# Patient Record
Sex: MALE | Race: WHITE | NOT HISPANIC OR LATINO | Employment: FULL TIME | URBAN - METROPOLITAN AREA
[De-identification: names, ages, dates, MRNs, and addresses within clinical notes are randomized per-mention and may not be internally consistent; named-entity substitution may affect disease eponyms.]

---

## 2022-08-02 ENCOUNTER — OFFICE VISIT (OUTPATIENT)
Dept: URGENT CARE | Facility: PHYSICIAN GROUP | Age: 57
End: 2022-08-02
Payer: COMMERCIAL

## 2022-08-02 ENCOUNTER — TELEPHONE (OUTPATIENT)
Dept: SCHEDULING | Facility: IMAGING CENTER | Age: 57
End: 2022-08-02

## 2022-08-02 VITALS
DIASTOLIC BLOOD PRESSURE: 88 MMHG | TEMPERATURE: 97.1 F | HEART RATE: 98 BPM | SYSTOLIC BLOOD PRESSURE: 140 MMHG | OXYGEN SATURATION: 98 % | HEIGHT: 68 IN | BODY MASS INDEX: 28.19 KG/M2 | WEIGHT: 186 LBS | RESPIRATION RATE: 16 BRPM

## 2022-08-02 DIAGNOSIS — Z86.79 HISTORY OF HYPERTENSION: ICD-10-CM

## 2022-08-02 DIAGNOSIS — Z76.0 MEDICATION REFILL: ICD-10-CM

## 2022-08-02 PROCEDURE — 99203 OFFICE O/P NEW LOW 30 MIN: CPT | Performed by: NURSE PRACTITIONER

## 2022-08-02 RX ORDER — LISINOPRIL 20 MG/1
20 TABLET ORAL DAILY
COMMUNITY
End: 2022-08-02 | Stop reason: SDUPTHER

## 2022-08-02 RX ORDER — METOPROLOL SUCCINATE 100 MG/1
100 TABLET, EXTENDED RELEASE ORAL DAILY
COMMUNITY
End: 2022-08-25

## 2022-08-02 RX ORDER — LISINOPRIL 20 MG/1
20 TABLET ORAL DAILY
Qty: 30 TABLET | Refills: 0 | Status: SHIPPED | OUTPATIENT
Start: 2022-08-02 | End: 2022-08-25 | Stop reason: SDUPTHER

## 2022-08-02 RX ORDER — METOPROLOL SUCCINATE 100 MG/1
100 TABLET, EXTENDED RELEASE ORAL DAILY
Qty: 30 TABLET | Refills: 0 | Status: SHIPPED | OUTPATIENT
Start: 2022-08-02 | End: 2022-08-25

## 2022-08-02 ASSESSMENT — ENCOUNTER SYMPTOMS
SENSORY CHANGE: 0
NEUROLOGICAL NEGATIVE: 1
VOMITING: 0
BLURRED VISION: 0
CONSTITUTIONAL NEGATIVE: 1
DIZZINESS: 0
WEAKNESS: 0
NAUSEA: 0
HEADACHES: 0

## 2022-08-02 ASSESSMENT — VISUAL ACUITY: OU: 1

## 2022-08-02 NOTE — PROGRESS NOTES
Subjective:     Stanley Jacinto is a 57 y.o. male who presents for Other (BP prescription refill. PCP not able to see him until end of month. Been off of them for 3 weeks.)       Other  This is a new problem. Pertinent negatives include no headaches, nausea, vomiting or weakness.     Patient presents for medication refills.  History of hypertension.  Takes lisinopril and metoprolol ER.  Has been out of medication for the past 3 weeks.  Denies symptoms at this time.    Has been seeing PCP.  However, was told he had an unpaid co-pay.  He eventually paid it but reports he was unable to get refills unless he is seen again and the next available appointment is not until September.  Currently switching PCPs and has an appointment later this month on the 25th.    Review of Systems   Constitutional: Negative.  Negative for malaise/fatigue.   Eyes: Negative for blurred vision.   Gastrointestinal: Negative for nausea and vomiting.   Neurological: Negative.  Negative for dizziness, sensory change, weakness and headaches.   All other systems reviewed and are negative.    Refer to HPI for additional details.    During this visit, appropriate PPE was worn, hand hygiene was performed, and the patient and any visitors were masked.    PMH:  has no past medical history on file.    MEDS:   Current Outpatient Medications:   •  metoprolol SR (TOPROL XL) 100 MG TABLET SR 24 HR, Take 100 mg by mouth every day., Disp: , Rfl:   •  lisinopril (PRINIVIL) 20 MG Tab, Take 1 Tablet by mouth every day., Disp: 30 Tablet, Rfl: 0  •  metoprolol SR (TOPROL XL) 100 MG TABLET SR 24 HR, Take 1 Tablet by mouth every day., Disp: 30 Tablet, Rfl: 0    ALLERGIES: Not on File  SURGHX: History reviewed. No pertinent surgical history.  SOCHX:  reports that he has never smoked. His smokeless tobacco use includes chew. He reports current alcohol use of about 3.0 oz of alcohol per week. He reports previous drug use.    FH: Per HPI as  "applicable/pertinent.      Objective:     BP (!) 140/88 (BP Location: Right arm, Patient Position: Sitting, BP Cuff Size: Adult)   Pulse 98   Temp 36.2 °C (97.1 °F) (Temporal)   Resp 16   Ht 1.727 m (5' 8\")   Wt 84.4 kg (186 lb)   SpO2 98%   BMI 28.28 kg/m²     Physical Exam  Nursing note reviewed.   Constitutional:       General: He is not in acute distress.     Appearance: He is well-developed. He is not ill-appearing or toxic-appearing.   Eyes:      General: Vision grossly intact.   Cardiovascular:      Rate and Rhythm: Normal rate.   Pulmonary:      Effort: Pulmonary effort is normal. No respiratory distress.   Musculoskeletal:         General: No deformity. Normal range of motion.   Skin:     Coloration: Skin is not pale.   Neurological:      Mental Status: He is alert and oriented to person, place, and time.      Motor: No weakness.   Psychiatric:         Behavior: Behavior normal. Behavior is cooperative.       Assessment/Plan:     1. History of hypertension  - lisinopril (PRINIVIL) 20 MG Tab; Take 1 Tablet by mouth every day.  Dispense: 30 Tablet; Refill: 0  - metoprolol SR (TOPROL XL) 100 MG TABLET SR 24 HR; Take 1 Tablet by mouth every day.  Dispense: 30 Tablet; Refill: 0    2. Medication refill  - lisinopril (PRINIVIL) 20 MG Tab; Take 1 Tablet by mouth every day.  Dispense: 30 Tablet; Refill: 0  - metoprolol SR (TOPROL XL) 100 MG TABLET SR 24 HR; Take 1 Tablet by mouth every day.  Dispense: 30 Tablet; Refill: 0    Bridge prescription refill until patient is able to establish with new PCP later this month. Rx as above sent electronically.      Differential diagnosis, natural history, supportive care, over-the-counter symptom management per 's instructions, close monitoring, and indications for immediate follow-up discussed.     All questions answered. Patient agrees with the plan of care.    Discharge summary provided via [facility].  "

## 2022-08-25 ENCOUNTER — OFFICE VISIT (OUTPATIENT)
Dept: MEDICAL GROUP | Facility: PHYSICIAN GROUP | Age: 57
End: 2022-08-25
Payer: COMMERCIAL

## 2022-08-25 VITALS
BODY MASS INDEX: 26.83 KG/M2 | SYSTOLIC BLOOD PRESSURE: 114 MMHG | HEIGHT: 68 IN | TEMPERATURE: 99 F | OXYGEN SATURATION: 98 % | RESPIRATION RATE: 16 BRPM | HEART RATE: 77 BPM | WEIGHT: 177 LBS | DIASTOLIC BLOOD PRESSURE: 62 MMHG

## 2022-08-25 DIAGNOSIS — Z76.89 ENCOUNTER TO ESTABLISH CARE WITH NEW DOCTOR: ICD-10-CM

## 2022-08-25 DIAGNOSIS — Z12.11 COLON CANCER SCREENING: ICD-10-CM

## 2022-08-25 DIAGNOSIS — Z00.00 WELL ADULT EXAM: ICD-10-CM

## 2022-08-25 DIAGNOSIS — I10 ESSENTIAL HYPERTENSION: ICD-10-CM

## 2022-08-25 DIAGNOSIS — Z72.0 CHEWING TOBACCO USE: ICD-10-CM

## 2022-08-25 PROCEDURE — 99214 OFFICE O/P EST MOD 30 MIN: CPT | Mod: 25 | Performed by: INTERNAL MEDICINE

## 2022-08-25 PROCEDURE — 99406 BEHAV CHNG SMOKING 3-10 MIN: CPT | Performed by: INTERNAL MEDICINE

## 2022-08-25 RX ORDER — LISINOPRIL 20 MG/1
20 TABLET ORAL DAILY
Qty: 90 TABLET | Refills: 3 | Status: SHIPPED | OUTPATIENT
Start: 2022-08-25 | End: 2022-09-02 | Stop reason: SDUPTHER

## 2022-08-25 RX ORDER — METOPROLOL SUCCINATE 100 MG/1
100 TABLET, EXTENDED RELEASE ORAL DAILY
Qty: 90 TABLET | Refills: 3 | Status: SHIPPED | OUTPATIENT
Start: 2022-08-25 | End: 2022-09-02 | Stop reason: SDUPTHER

## 2022-08-25 ASSESSMENT — PATIENT HEALTH QUESTIONNAIRE - PHQ9: CLINICAL INTERPRETATION OF PHQ2 SCORE: 0

## 2022-08-25 NOTE — ASSESSMENT & PLAN NOTE
Patient presents today to establish care with new primary care provider.    Past medical history    Essential hypertension.  The patient is presently taking lisinopril and metoprolol.  Blood pressure has been well controlled.  The patient request prescription refills    Past surgical history none    Family history Father with prostate cancer    Social history patient chews tobacco.  Strongly advised the patient to discontinue.

## 2022-08-25 NOTE — PROGRESS NOTES
"PRIMARY CARE CLINIC VISIT  Chief Complaint   Patient presents with    New Patient     Establish care  Blood pressure check  Prescription refills    History of Present Illness     Encounter to establish care with new doctor  Patient presents today to establish care with new primary care provider.    Past medical history    Essential hypertension.  The patient is presently taking lisinopril and metoprolol.  Blood pressure has been well controlled.  The patient request prescription refills    Past surgical history none    Family history Father with prostate cancer    Social history patient chews tobacco.  Strongly advised the patient to discontinue.    No current outpatient medications on file prior to visit.     No current facility-administered medications on file prior to visit.        Allergies: Patient has no known allergies.    ROS  As per HPI above. All other systems reviewed and negative.      Past Medical, Social, and Family history reviewed and updated in EPIC     Objective     /62 (BP Location: Left arm, Patient Position: Sitting, BP Cuff Size: Adult)   Pulse 77   Temp 37.2 °C (99 °F) (Temporal)   Resp 16   Ht 1.727 m (5' 8\")   Wt 80.3 kg (177 lb)   SpO2 98%    Body mass index is 26.91 kg/m².    General: alert in no apparent distress.  Cardiovascular: regular rate and rhythm  Pulmonary: lungs : no wheezing   Gastrointestinal: BS present. No obvious mass noted  Neuro nonfocal cranial nerve II to XII grossly intact        Assessment and Plan     1. Well adult exam    - HEMOGLOBIN A1C; Future  - ALANINE AMINO-TRANS; Future  - Basic Metabolic Panel; Future  - CBC WITHOUT DIFFERENTIAL; Future  - Lipid Profile; Future  - PROSTATE SPECIFIC AG SCREENING; Future  - TSH; Future  - MICROALBUMIN CREAT RATIO URINE; Future  Routine lab work ordered.  Advised the patient follow-up after lab test done.  2. Essential hypertension  - lisinopril (PRINIVIL) 20 MG Tab; Take 1 Tablet by mouth every day.  Dispense: 90 " Tablet; Refill: 3  - metoprolol SR (TOPROL XL) 100 MG TABLET SR 24 HR; Take 1 Tablet by mouth every day.  Dispense: 90 Tablet; Refill: 3  3. Chewing tobacco use  I spent 4 minutes of face to face counseling pt regarding tobacco cessation.   Discussed w pt the potential risks/hazards of tobacco.    Strongly advised pt to quit.  Continue to work on reducing. Attempt 50% reduction every 2-3 weeks.   4. Colon cancer screening  - COLOGUARD (FIT DNA)                      Healthcare Maintenance     Health Maintenance Due   Topic Date Due    COLORECTAL CANCER SCREENING  Never done    COVID-19 Vaccine (2 - Pfizer series) 10/09/2021       Recommend hepatitis B.  The patient declined     also advised the patient to go to local pharmacy for the COVID-vaccine    Follow-up 6 months    Please note that this dictation was created using voice recognition software. I have made every reasonable attempt to correct obvious errors, but I expect that there are errors of grammar and possibly content that I did not discover before finalizing the note.    Aba Ferrari MD  Internal Medicine  [facility]

## 2022-09-02 DIAGNOSIS — I10 ESSENTIAL HYPERTENSION: ICD-10-CM

## 2022-09-03 RX ORDER — LISINOPRIL 20 MG/1
20 TABLET ORAL DAILY
Qty: 90 TABLET | Refills: 3 | Status: SHIPPED | OUTPATIENT
Start: 2022-09-03 | End: 2023-11-14

## 2022-09-03 RX ORDER — METOPROLOL SUCCINATE 100 MG/1
100 TABLET, EXTENDED RELEASE ORAL DAILY
Qty: 90 TABLET | Refills: 3 | Status: SHIPPED | OUTPATIENT
Start: 2022-09-03 | End: 2023-11-14

## 2022-12-20 ENCOUNTER — HOSPITAL ENCOUNTER (OUTPATIENT)
Dept: RADIOLOGY | Facility: MEDICAL CENTER | Age: 57
End: 2022-12-20
Attending: PHYSICIAN ASSISTANT
Payer: COMMERCIAL

## 2022-12-20 ENCOUNTER — OFFICE VISIT (OUTPATIENT)
Dept: URGENT CARE | Facility: PHYSICIAN GROUP | Age: 57
End: 2022-12-20
Payer: COMMERCIAL

## 2022-12-20 VITALS
HEIGHT: 68 IN | RESPIRATION RATE: 16 BRPM | WEIGHT: 185 LBS | HEART RATE: 72 BPM | DIASTOLIC BLOOD PRESSURE: 74 MMHG | OXYGEN SATURATION: 97 % | BODY MASS INDEX: 28.04 KG/M2 | TEMPERATURE: 97 F | SYSTOLIC BLOOD PRESSURE: 120 MMHG

## 2022-12-20 DIAGNOSIS — M79.642 LEFT HAND PAIN: ICD-10-CM

## 2022-12-20 DIAGNOSIS — M19.042 OSTEOARTHRITIS OF LEFT HAND, UNSPECIFIED OSTEOARTHRITIS TYPE: ICD-10-CM

## 2022-12-20 PROCEDURE — 73130 X-RAY EXAM OF HAND: CPT | Mod: LT

## 2022-12-20 PROCEDURE — 99213 OFFICE O/P EST LOW 20 MIN: CPT | Performed by: PHYSICIAN ASSISTANT

## 2022-12-20 RX ORDER — METHYLPREDNISOLONE 4 MG/1
TABLET ORAL
Qty: 21 TABLET | Refills: 0 | Status: SHIPPED | OUTPATIENT
Start: 2022-12-20 | End: 2023-12-29
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
## 2023-08-14 ENCOUNTER — HOSPITAL ENCOUNTER (OUTPATIENT)
Dept: LAB | Facility: MEDICAL CENTER | Age: 58
End: 2023-08-14
Attending: INTERNAL MEDICINE
Payer: COMMERCIAL

## 2023-08-14 DIAGNOSIS — Z00.00 WELL ADULT EXAM: ICD-10-CM

## 2023-08-14 LAB
ALT SERPL-CCNC: 33 U/L (ref 2–50)
ANION GAP SERPL CALC-SCNC: 11 MMOL/L (ref 7–16)
BUN SERPL-MCNC: 8 MG/DL (ref 8–22)
CALCIUM SERPL-MCNC: 9.6 MG/DL (ref 8.5–10.5)
CHLORIDE SERPL-SCNC: 106 MMOL/L (ref 96–112)
CHOLEST SERPL-MCNC: 252 MG/DL (ref 100–199)
CO2 SERPL-SCNC: 24 MMOL/L (ref 20–33)
CREAT SERPL-MCNC: 1.12 MG/DL (ref 0.5–1.4)
CREAT UR-MCNC: 190.14 MG/DL
FASTING STATUS PATIENT QL REPORTED: NORMAL
GFR SERPLBLD CREATININE-BSD FMLA CKD-EPI: 76 ML/MIN/1.73 M 2
GLUCOSE SERPL-MCNC: 101 MG/DL (ref 65–99)
HDLC SERPL-MCNC: 46 MG/DL
LDLC SERPL CALC-MCNC: 177 MG/DL
MICROALBUMIN UR-MCNC: <1.2 MG/DL
MICROALBUMIN/CREAT UR: NORMAL MG/G (ref 0–30)
POTASSIUM SERPL-SCNC: 4.6 MMOL/L (ref 3.6–5.5)
PSA SERPL-MCNC: 1.09 NG/ML (ref 0–4)
SODIUM SERPL-SCNC: 141 MMOL/L (ref 135–145)
TRIGL SERPL-MCNC: 146 MG/DL (ref 0–149)
TSH SERPL DL<=0.005 MIU/L-ACNC: 2.78 UIU/ML (ref 0.38–5.33)

## 2023-08-14 PROCEDURE — 84153 ASSAY OF PSA TOTAL: CPT

## 2023-08-14 PROCEDURE — 80048 BASIC METABOLIC PNL TOTAL CA: CPT

## 2023-08-14 PROCEDURE — 84460 ALANINE AMINO (ALT) (SGPT): CPT

## 2023-08-14 PROCEDURE — 84443 ASSAY THYROID STIM HORMONE: CPT

## 2023-08-14 PROCEDURE — 36415 COLL VENOUS BLD VENIPUNCTURE: CPT

## 2023-08-14 PROCEDURE — 80061 LIPID PANEL: CPT

## 2023-08-14 PROCEDURE — 82043 UR ALBUMIN QUANTITATIVE: CPT

## 2023-08-14 PROCEDURE — 82570 ASSAY OF URINE CREATININE: CPT

## 2023-08-14 PROCEDURE — 83036 HEMOGLOBIN GLYCOSYLATED A1C: CPT

## 2023-08-15 PROBLEM — E78.5 DYSLIPIDEMIA: Status: ACTIVE | Noted: 2023-08-15

## 2023-08-15 PROBLEM — E78.5 DYSLIPIDEMIA: Chronic | Status: ACTIVE | Noted: 2023-08-15

## 2023-08-15 PROBLEM — R73.03 PREDIABETES: Status: ACTIVE | Noted: 2023-08-15

## 2023-08-15 PROBLEM — R73.03 PREDIABETES: Chronic | Status: ACTIVE | Noted: 2023-08-15

## 2023-08-15 LAB
EST. AVERAGE GLUCOSE BLD GHB EST-MCNC: 117 MG/DL
HBA1C MFR BLD: 5.7 % (ref 4–5.6)

## 2023-11-13 DIAGNOSIS — I10 ESSENTIAL HYPERTENSION: ICD-10-CM

## 2023-11-14 RX ORDER — LISINOPRIL 20 MG/1
20 TABLET ORAL DAILY
Qty: 30 TABLET | Refills: 0 | Status: SHIPPED | OUTPATIENT
Start: 2023-11-14 | End: 2024-01-05

## 2023-11-14 RX ORDER — METOPROLOL SUCCINATE 100 MG/1
100 TABLET, EXTENDED RELEASE ORAL DAILY
Qty: 30 TABLET | Refills: 0 | Status: SHIPPED | OUTPATIENT
Start: 2023-11-14 | End: 2024-01-05

## 2023-11-14 NOTE — TELEPHONE ENCOUNTER
Received request via: Pharmacy    Was the patient seen in the last year in this department? No 08/2022    Does the patient have an active prescription (recently filled or refills available) for medication(s) requested? No    Does the patient have [facility] and need 100 day supply (blood pressure, diabetes and cholesterol meds only)? Patient does not have SCP

## 2023-12-29 ENCOUNTER — OFFICE VISIT (OUTPATIENT)
Dept: URGENT CARE | Facility: PHYSICIAN GROUP | Age: 58
End: 2023-12-29
Payer: COMMERCIAL

## 2023-12-29 VITALS
TEMPERATURE: 97.3 F | WEIGHT: 176.37 LBS | HEART RATE: 98 BPM | BODY MASS INDEX: 26.73 KG/M2 | HEIGHT: 68 IN | SYSTOLIC BLOOD PRESSURE: 130 MMHG | RESPIRATION RATE: 14 BRPM | OXYGEN SATURATION: 97 % | DIASTOLIC BLOOD PRESSURE: 86 MMHG

## 2023-12-29 DIAGNOSIS — S39.012A LUMBAR STRAIN, INITIAL ENCOUNTER: ICD-10-CM

## 2023-12-29 DIAGNOSIS — S16.1XXA CERVICAL STRAIN, ACUTE, INITIAL ENCOUNTER: ICD-10-CM

## 2023-12-29 DIAGNOSIS — S46.911A SHOULDER STRAIN, RIGHT, INITIAL ENCOUNTER: ICD-10-CM

## 2023-12-29 PROCEDURE — 99213 OFFICE O/P EST LOW 20 MIN: CPT

## 2023-12-29 PROCEDURE — 3079F DIAST BP 80-89 MM HG: CPT

## 2023-12-29 PROCEDURE — 3075F SYST BP GE 130 - 139MM HG: CPT

## 2023-12-29 RX ORDER — CYCLOBENZAPRINE HCL 5 MG
5-10 TABLET ORAL NIGHTLY PRN
Qty: 10 TABLET | Refills: 0 | Status: SHIPPED | OUTPATIENT
Start: 2023-12-29

## 2023-12-29 RX ORDER — METHYLPREDNISOLONE 4 MG/1
TABLET ORAL
Qty: 21 TABLET | Refills: 0 | Status: SHIPPED | OUTPATIENT
Start: 2023-12-29

## 2023-12-30 ASSESSMENT — ENCOUNTER SYMPTOMS
NAUSEA: 0
LOSS OF CONSCIOUSNESS: 0
SENSORY CHANGE: 0
DIZZINESS: 0
SPEECH CHANGE: 0
DOUBLE VISION: 0
ABDOMINAL PAIN: 0
MYALGIAS: 0
HEADACHES: 0
BLURRED VISION: 0
FOCAL WEAKNESS: 0
NECK PAIN: 1
FEVER: 0
SHORTNESS OF BREATH: 0
WEAKNESS: 0
TINGLING: 0
VOMITING: 0
EYE PAIN: 0
SEIZURES: 0
DIARRHEA: 0
FALLS: 0
BACK PAIN: 1
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
## 2024-01-05 DIAGNOSIS — I10 ESSENTIAL HYPERTENSION: ICD-10-CM

## 2024-01-05 RX ORDER — LISINOPRIL 20 MG/1
20 TABLET ORAL DAILY
Qty: 30 TABLET | Refills: 0 | Status: SHIPPED | OUTPATIENT
Start: 2024-01-05 | End: 2024-02-08 | Stop reason: SDUPTHER

## 2024-01-05 RX ORDER — METOPROLOL SUCCINATE 100 MG/1
100 TABLET, EXTENDED RELEASE ORAL DAILY
Qty: 30 TABLET | Refills: 0 | Status: SHIPPED | OUTPATIENT
Start: 2024-01-05 | End: 2024-02-08 | Stop reason: SDUPTHER

## 2024-02-08 DIAGNOSIS — I10 ESSENTIAL HYPERTENSION: ICD-10-CM

## 2024-02-08 RX ORDER — METOPROLOL SUCCINATE 100 MG/1
100 TABLET, EXTENDED RELEASE ORAL DAILY
Qty: 30 TABLET | Refills: 0 | Status: SHIPPED
Start: 2024-02-08 | End: 2024-03-07

## 2024-02-08 RX ORDER — LISINOPRIL 20 MG/1
20 TABLET ORAL DAILY
Qty: 30 TABLET | Refills: 0 | Status: SHIPPED
Start: 2024-02-08 | End: 2024-03-07

## 2024-02-08 NOTE — TELEPHONE ENCOUNTER
Received request via: Patient    Was the patient seen in the last year in this department? No    Does the patient have an active prescription (recently filled or refills available) for medication(s) requested? No        Does the patient have [facility] and need 100 day supply (blood pressure, diabetes and cholesterol meds only)? Patient does not have SCP    Patient has an appointment coming up on 3/7/2024

## 2024-03-07 ENCOUNTER — OFFICE VISIT (OUTPATIENT)
Dept: MEDICAL GROUP | Facility: PHYSICIAN GROUP | Age: 59
End: 2024-03-07
Payer: COMMERCIAL

## 2024-03-07 VITALS
RESPIRATION RATE: 16 BRPM | SYSTOLIC BLOOD PRESSURE: 112 MMHG | DIASTOLIC BLOOD PRESSURE: 60 MMHG | HEIGHT: 68 IN | BODY MASS INDEX: 26.98 KG/M2 | WEIGHT: 178 LBS | HEART RATE: 58 BPM | OXYGEN SATURATION: 99 % | TEMPERATURE: 97.1 F

## 2024-03-07 DIAGNOSIS — I10 ESSENTIAL HYPERTENSION: Chronic | ICD-10-CM

## 2024-03-07 DIAGNOSIS — R73.03 PREDIABETES: Chronic | ICD-10-CM

## 2024-03-07 DIAGNOSIS — Z72.0 CHEWING TOBACCO USE: ICD-10-CM

## 2024-03-07 DIAGNOSIS — Z11.4 SCREENING FOR HIV (HUMAN IMMUNODEFICIENCY VIRUS): ICD-10-CM

## 2024-03-07 DIAGNOSIS — E78.5 DYSLIPIDEMIA: Chronic | ICD-10-CM

## 2024-03-07 DIAGNOSIS — Z11.59 NEED FOR HEPATITIS C SCREENING TEST: ICD-10-CM

## 2024-03-07 DIAGNOSIS — Z23 NEED FOR VACCINATION: ICD-10-CM

## 2024-03-07 PROCEDURE — 3074F SYST BP LT 130 MM HG: CPT | Performed by: INTERNAL MEDICINE

## 2024-03-07 PROCEDURE — 99214 OFFICE O/P EST MOD 30 MIN: CPT | Performed by: INTERNAL MEDICINE

## 2024-03-07 PROCEDURE — 3078F DIAST BP <80 MM HG: CPT | Performed by: INTERNAL MEDICINE

## 2024-03-07 RX ORDER — METOPROLOL SUCCINATE 100 MG/1
100 TABLET, EXTENDED RELEASE ORAL DAILY
Qty: 90 TABLET | Refills: 3 | Status: SHIPPED | OUTPATIENT
Start: 2024-03-07

## 2024-03-07 RX ORDER — ROSUVASTATIN CALCIUM 20 MG/1
20 TABLET, COATED ORAL EVERY EVENING
Qty: 90 TABLET | Refills: 3 | Status: SHIPPED | OUTPATIENT
Start: 2024-03-07

## 2024-03-07 RX ORDER — LISINOPRIL 20 MG/1
20 TABLET ORAL DAILY
Qty: 90 TABLET | Refills: 3 | Status: SHIPPED | OUTPATIENT
Start: 2024-03-07

## 2024-03-07 ASSESSMENT — PATIENT HEALTH QUESTIONNAIRE - PHQ9: CLINICAL INTERPRETATION OF PHQ2 SCORE: 0

## 2024-03-07 NOTE — ASSESSMENT & PLAN NOTE
Review show patient is due for hepatitis B and influenza.  The patient however declined to get these vaccines today

## 2024-03-07 NOTE — ASSESSMENT & PLAN NOTE
Chronic condition.  The patient continues to chew tobacco.  Strongly advised the patient to discontinue

## 2024-03-07 NOTE — PROGRESS NOTES
PRIMARY CARE CLINIC VISIT        Chief Complaint   Patient presents with    Follow-Up      Follow-up hypertension  Hyperlipidemia  Prediabetes  Overweight  Chewing tobacco  Vaccination status  Lab test request including hepatitis C and HIV testing        History of Present Illness     Dyslipidemia  Chronic condition.  Patient presently not on therapy.  Previous lab test result discussed with the patient.    Essential hypertension  Chronic ongoing condition.  Patient currently taking lisinopril and metoprolol daily.  Patient tolerating medication well.  Blood pressure has been well-controlled.  Patient request Rx refill.    Prediabetes  This is a chronic condition.  Patient currently on diet therapy.  Lab test result discussed with the patient.    Need for vaccination  Review show patient is due for hepatitis B and influenza.  The patient however declined to get these vaccines today    Chewing tobacco use  Chronic condition.  The patient continues to chew tobacco.  Strongly advised the patient to discontinue    BMI 27.0-27.9,adult  Chronic condition.  Discussed with the patient regarding diet, exercise, and lifestyle modification to help achieve and maintain healthy weight         Current Outpatient Medications on File Prior to Visit   Medication Sig Dispense Refill    cyclobenzaprine (FLEXERIL) 5 mg tablet Take 1-2 Tablets by mouth at bedtime as needed for Moderate Pain or Muscle Spasms. 10 Tablet 0    methylPREDNISolone (MEDROL DOSEPAK) 4 MG Tablet Therapy Pack Follow schedule on package instructions. (Patient not taking: Reported on 3/7/2024) 21 Tablet 0     No current facility-administered medications on file prior to visit.        Allergies: Patient has no known allergies.    Current Outpatient Medications Ordered in Epic   Medication Sig Dispense Refill    lisinopril (PRINIVIL) 20 MG Tab Take 1 Tablet by mouth every day. 90 Tablet 3    metoprolol SR (TOPROL XL) 100 MG TABLET SR 24 HR Take 1 Tablet by mouth  "every day. 90 Tablet 3    rosuvastatin (CRESTOR) 20 MG Tab Take 1 Tablet by mouth every evening. 90 Tablet 3    cyclobenzaprine (FLEXERIL) 5 mg tablet Take 1-2 Tablets by mouth at bedtime as needed for Moderate Pain or Muscle Spasms. 10 Tablet 0    methylPREDNISolone (MEDROL DOSEPAK) 4 MG Tablet Therapy Pack Follow schedule on package instructions. (Patient not taking: Reported on 3/7/2024) 21 Tablet 0     No current Epic-ordered facility-administered medications on file.       History reviewed. No pertinent past medical history.    History reviewed. No pertinent surgical history.    Family History   Problem Relation Age of Onset    Cancer Father         prostate       Social History     Tobacco Use   Smoking Status Never   Smokeless Tobacco Current    Types: Chew       Social History     Substance and Sexual Activity   Alcohol Use Yes    Alcohol/week: 3.0 oz    Types: 5 Cans of beer per week       Review of systems  As per HPI above. All other systems reviewed and negative.      Past Medical, Social, and Family history reviewed and updated in [facility]       LAB DATA:    Lab Results   Component Value Date/Time    HBA1C 5.7 (H) 08/14/2023 01:49 PM       No results found for: \"WBC\", \"HEMOGLOBIN\", \"HEMATOCRIT\", \"MCV\", \"PLATELETCT\"    Lab Results   Component Value Date/Time    SODIUM 141 08/14/2023 01:49 PM    POTASSIUM 4.6 08/14/2023 01:49 PM    GLUCOSE 101 (H) 08/14/2023 01:49 PM    BUN 8 08/14/2023 01:49 PM    CREATININE 1.12 08/14/2023 01:49 PM       Lab Results   Component Value Date/Time    CHOLSTRLTOT 252 (H) 08/14/2023 01:49 PM    TRIGLYCERIDE 146 08/14/2023 01:49 PM    HDL 46 08/14/2023 01:49 PM     (H) 08/14/2023 01:49 PM       Lab Results   Component Value Date/Time    ALTSGPT 33 08/14/2023 01:49 PM          Objective     /60 (BP Location: Left arm, Patient Position: Sitting, BP Cuff Size: Large adult)   Pulse (!) 58   Temp 36.2 °C (97.1 °F) (Temporal)   Resp 16   Ht 1.727 m (5' 8\")   Wt 80.7 kg " (178 lb)   SpO2 99%    Body mass index is 27.06 kg/m².    General: alert in no apparent distress.  Cardiovascular: regular rate and rhythm  Pulmonary: lungs : no wheezing   Gastrointestinal: BS present.       Assessment and Plan     1. Dyslipidemia  - rosuvastatin (CRESTOR) 20 MG Tab; Take 1 Tablet by mouth every evening.  Dispense: 90 Tablet; Refill: 3  - ALANINE AMINO-TRANS; Future  - Lipid Profile; Future  Chronic condition.  Uncontrolled.  Recommend patient to start taking Crestor 20 Mg daily.  Patient to follow-up again in 3 months to repeat the lab test for follow-up.  Potential side effect of medication discussed with the patient.    2. Essential hypertension  - lisinopril (PRINIVIL) 20 MG Tab; Take 1 Tablet by mouth every day.  Dispense: 90 Tablet; Refill: 3  - metoprolol SR (TOPROL XL) 100 MG TABLET SR 24 HR; Take 1 Tablet by mouth every day.  Dispense: 90 Tablet; Refill: 3  Chronic stable condition.  Continue lisinopril 20 Mg daily metoprolol 100 Mg daily.  Sodium restriction is advised.  Continue to monitor    3. Prediabetes  - HEMOGLOBIN A1C; Future  - Basic Metabolic Panel; Future  Chronic condition.  Current status unclear.  Lab test ordered for follow-up.  Recommend low sweet low-carb diet and regular exercise activity.    4. BMI 27.0-27.9,adult  Chronic condition.  Patient reported that with diet and exercise he has lost approximately 7 pounds.  Patient stated that he will try to lose a little bit more.    5. Chewing tobacco use  Chronic condition.  Uncontrolled.  Strongly advised the patient to quit chewing tobacco.    6. Need for vaccination  As above recommended but the patient refuses recommended vaccines.    7. Screening for HIV (human immunodeficiency virus)  - HIV AG/AB COMBO ASSAY SCREENING; Future    8. Need for hepatitis C screening test  - HEP C VIRUS ANTIBODY; Future            Attestation: I spent:  32  min -  That includes time for chart review before the visit, the actual patient  visit, and time spent on documentation in EMR after the visit.  Chart review/prep, review of other providers' records, imaging/lab review, face-to-face time for history/examination, pt's counseling/education, ordering, prescribing,  review of results/meds/ treatment plan with patient, and care coordination.           Healthcare Maintenance       Health Maintenance Due   Topic Date Due    HIV Screening  Never done    Hepatitis B Vaccine (Hep B) (1 of 3 - 3-dose series) Never done    Hepatitis C Screening  Never done    Colorectal Cancer Screening  Never done    Zoster (Shingles) Vaccines (1 of 2) Never done    Influenza Vaccine (1) Never done    COVID-19 Vaccine (2 - 2023-24 season) 09/01/2023               Please note that this dictation was created using voice recognition software. I have made every reasonable attempt to correct obvious errors, but I expect that there are errors of grammar and possibly content that I did not discover before finalizing the note.    Aba Ferrari MD  Internal Medicine  [facility] care [facility]

## 2024-03-07 NOTE — ASSESSMENT & PLAN NOTE
This is a chronic condition.  Patient currently on diet therapy.  Lab test result discussed with the patient.

## 2024-03-07 NOTE — ASSESSMENT & PLAN NOTE
Chronic condition.  Patient presently not on therapy.  Previous lab test result discussed with the patient.

## 2024-03-07 NOTE — ASSESSMENT & PLAN NOTE
Chronic ongoing condition.  Patient currently taking lisinopril and metoprolol daily.  Patient tolerating medication well.  Blood pressure has been well-controlled.  Patient request Rx refill.

## 2024-04-25 ENCOUNTER — OFFICE VISIT (OUTPATIENT)
Dept: URGENT CARE | Facility: PHYSICIAN GROUP | Age: 59
End: 2024-04-25
Payer: COMMERCIAL

## 2024-04-25 VITALS
DIASTOLIC BLOOD PRESSURE: 76 MMHG | TEMPERATURE: 98.2 F | BODY MASS INDEX: 27.15 KG/M2 | HEIGHT: 68 IN | HEART RATE: 72 BPM | RESPIRATION RATE: 16 BRPM | SYSTOLIC BLOOD PRESSURE: 116 MMHG | OXYGEN SATURATION: 98 % | WEIGHT: 179.12 LBS

## 2024-04-25 DIAGNOSIS — M10.9 ACUTE GOUT OF RIGHT FOOT, UNSPECIFIED CAUSE: ICD-10-CM

## 2024-04-25 DIAGNOSIS — B86 SCABIES: ICD-10-CM

## 2024-04-25 PROCEDURE — 3074F SYST BP LT 130 MM HG: CPT | Performed by: NURSE PRACTITIONER

## 2024-04-25 PROCEDURE — 99214 OFFICE O/P EST MOD 30 MIN: CPT | Performed by: NURSE PRACTITIONER

## 2024-04-25 PROCEDURE — 3078F DIAST BP <80 MM HG: CPT | Performed by: NURSE PRACTITIONER

## 2024-04-25 RX ORDER — PERMETHRIN 50 MG/G
CREAM TOPICAL
Qty: 120 G | Refills: 0 | Status: SHIPPED | OUTPATIENT
Start: 2024-04-25

## 2024-04-25 RX ORDER — PREDNISONE 20 MG/1
40 TABLET ORAL DAILY
Qty: 10 TABLET | Refills: 0 | Status: SHIPPED | OUTPATIENT
Start: 2024-04-25 | End: 2024-04-30

## 2024-04-25 RX ORDER — HYDROXYZINE HYDROCHLORIDE 25 MG/1
25 TABLET, FILM COATED ORAL EVERY 8 HOURS PRN
Qty: 21 TABLET | Refills: 0 | Status: SHIPPED | OUTPATIENT
Start: 2024-04-25 | End: 2024-05-02

## 2024-04-25 NOTE — PROGRESS NOTES
Date: 04/25/24        Chief Complaint   Patient presents with    Rash     Rash around neck, chest, arms, and belt line, tried to put cream on and the cream did not work X 2 weeks     Gout     Gout in the (R) foot, gets gout in both feet but recently its been more intense in the (R) foot, hurts to put weight on foot and hurts to put work shoes on as well   X 2 weeks         History of Present Illness: 59 y.o.  male presents to clinic with 2 separate issues.  Patient reports a rash that originally started on his sock line and now has moved to his beltline chest and bilateral arms.  Patient states it is significantly itchy worse at night.  He states that he had Kenalog cream and he did use it and it made his symptoms much worse.  He denies it is painful.  No fever body aches.  Patient is also having symptoms of gout in the right foot.  Patient states he has had flares in both feet but he is able to make lifestyle changes and diet changes and they usually resolve on their own.  Currently has had a flare to his right foot for the past 2 weeks with painful walking.  He denies any trauma.  No fevers or bodyaches.        ROS:    No severe shortness of breath   No Cardiac like chest pain, as discussed   As otherwise stated in HPI    Medical/SX/ Social History:  Reviewed per chart    Pertinent Medications:    Current Outpatient Medications on File Prior to Visit   Medication Sig Dispense Refill    lisinopril (PRINIVIL) 20 MG Tab Take 1 Tablet by mouth every day. 90 Tablet 3    metoprolol SR (TOPROL XL) 100 MG TABLET SR 24 HR Take 1 Tablet by mouth every day. 90 Tablet 3    rosuvastatin (CRESTOR) 20 MG Tab Take 1 Tablet by mouth every evening. 90 Tablet 3    cyclobenzaprine (FLEXERIL) 5 mg tablet Take 1-2 Tablets by mouth at bedtime as needed for Moderate Pain or Muscle Spasms. 10 Tablet 0    methylPREDNISolone (MEDROL DOSEPAK) 4 MG Tablet Therapy Pack Follow schedule on package instructions. (Patient not taking: Reported on  3/7/2024) 21 Tablet 0     No current facility-administered medications on file prior to visit.        Allergies:    Patient has no known allergies.     Problem list, medications, and allergies reviewed by myself today in Epic     Physical Exam:    Vitals:    04/25/24 1213   BP: 116/76   Pulse: 72   Resp: 16   Temp: 36.8 °C (98.2 °F)   SpO2: 98%             Physical Exam  Constitutional:       General: He is awake.      Appearance: Normal appearance. He is not ill-appearing, toxic-appearing or diaphoretic.   HENT:      Head: Normocephalic and atraumatic.      Right Ear: Tympanic membrane, ear canal and external ear normal.      Left Ear: Tympanic membrane, ear canal and external ear normal.   Eyes:      General: Lids are normal. Gaze aligned appropriately. No allergic shiner or scleral icterus.     Extraocular Movements: Extraocular movements intact.      Conjunctiva/sclera: Conjunctivae normal.      Pupils: Pupils are equal, round, and reactive to light.   Cardiovascular:      Rate and Rhythm: Normal rate and regular rhythm.      Pulses:           Radial pulses are 2+ on the right side and 2+ on the left side.      Heart sounds: Normal heart sounds.   Pulmonary:      Effort: Pulmonary effort is normal.      Breath sounds: Normal breath sounds and air entry. No decreased breath sounds, wheezing, rhonchi or rales.   Musculoskeletal:      Right lower leg: No edema.      Left lower leg: No edema.        Feet:    Lymphadenopathy:      Cervical: No cervical adenopathy.   Skin:     General: Skin is warm.      Capillary Refill: Capillary refill takes less than 2 seconds.      Coloration: Skin is not cyanotic or pale.      Findings: Rash present. Rash is crusting and papular.      Comments: Rash with burrows with papules and scabbing in straight line to belt line, sock line back chest and bilateral arms.    Neurological:      Mental Status: He is alert and oriented to person, place, and time.      Gait: Gait is intact.    Psychiatric:         Behavior: Behavior normal. Behavior is cooperative.                Medical Decision making and plan :  I personally reviewed prior external notes and test results pertinent to today's visit. Pt is clinically stable at today's acute urgent care visit.  Patient appears nontoxic with no acute distress noted. Appropriate for outpatient care at this time.      Pleasant 59 y.o. male presented clinic with 2 separate issues.  Chronic gout with acute exacerbation.  Right foot with erythema swelling to the right second toe.  No surface trauma or signs of cellulitis.  Will treat with prednisone.  Rash consistent with scabies with burrows noted.  Did discuss treatment.  Did send for hydroxyzine for itching advised that will cause sedation and to use at night.    1. Acute gout of right foot, unspecified cause    - predniSONE (DELTASONE) 20 MG Tab; Take 2 Tablets by mouth every day for 5 days.  Dispense: 10 Tablet; Refill: 0    2. Scabies    - permethrin (ELIMITE) 5 % Cream; Apply 30g (around half of the 60 gram tube) head to toe on skin and do not wash or rinse for 8-14 hours, then repeat application in 7-10 days.  Dispense: 120 g; Refill: 0  - hydrOXYzine HCl (ATARAX) 25 MG Tab; Take 1 Tablet by mouth every 8 hours as needed for Itching for up to 7 days.  Dispense: 21 Tablet; Refill: 0       Shared decision-making was utilized with patient for treatment plan. Medication discussed included indication for use and the potential benefits and side effects. Education was provided regarding the aforementioned assessments.  Differential Diagnosis, natural history, and supportive care discussed. All of the patient's questions were answered to their satisfaction at the time of discharge. Patient was encouraged to monitor symptoms closely. Those signs and symptoms which would warrant concern and mandate seeking a higher level of service through the emergency department discussed at length.  Patient stated agreement  and understanding of this plan of care.    Disposition:  Home in stable condition       Voice Recognition Disclaimer:  Portions of this document were created using voice recognition software. The software does have a chance of producing errors of grammar and possibly content. I have made every reasonable attempt to correct obvious errors, but there may be errors of grammar and possibly content that I did not discover before finalizing the documentation.    ARY Guillen.

## 2024-05-29 ENCOUNTER — OFFICE VISIT (OUTPATIENT)
Dept: URGENT CARE | Facility: PHYSICIAN GROUP | Age: 59
End: 2024-05-29
Payer: COMMERCIAL

## 2024-05-29 VITALS
SYSTOLIC BLOOD PRESSURE: 126 MMHG | HEART RATE: 74 BPM | WEIGHT: 172 LBS | OXYGEN SATURATION: 98 % | BODY MASS INDEX: 26.07 KG/M2 | RESPIRATION RATE: 16 BRPM | TEMPERATURE: 96.8 F | DIASTOLIC BLOOD PRESSURE: 72 MMHG | HEIGHT: 68 IN

## 2024-05-29 DIAGNOSIS — R21 RASH AND NONSPECIFIC SKIN ERUPTION: ICD-10-CM

## 2024-05-29 RX ORDER — IVERMECTIN 3 MG/1
TABLET ORAL
Qty: 12 TABLET | Refills: 0 | Status: SHIPPED | OUTPATIENT
Start: 2024-05-29

## 2024-05-29 RX ORDER — IVERMECTIN 3 MG/1
TABLET ORAL
Qty: 12 TABLET | Refills: 0 | Status: SHIPPED
Start: 2024-05-29 | End: 2024-05-29

## 2024-05-29 RX ORDER — METHYLPREDNISOLONE 4 MG/1
TABLET ORAL
Qty: 21 TABLET | Refills: 0 | Status: SHIPPED | OUTPATIENT
Start: 2024-05-29

## 2024-05-29 ASSESSMENT — ENCOUNTER SYMPTOMS
FEVER: 0
CHILLS: 0
MYALGIAS: 0

## 2024-05-29 NOTE — PROGRESS NOTES
Subjective:     CHIEF COMPLAINT  Chief Complaint   Patient presents with    Rash     For about 3 and half weeks now has been having rash, he was given cream and oral medicine but the rash did not go away completely, its on his back, waist, thighs, legs, arms,        HPI  Stanley Jacinto is a very pleasant 59 y.o. male who presents to the clinic with a persistent rash diffusely over his body for the last month.  Patient was recently seen in clinic on 4/25/2024 for the same issue.  At that time he was diagnosed with potential scabies.  He was started on permethrin.  States he applied the lotion twice over 2 weeks and noted minimal improvement.  At his last visit he was also experiencing a gout flare and was started on a course of prednisone.  States he did notice improvement to the rash while on the steroid however once completed rash gradually returned.  Nobody else in the house is experiencing similar rash.  He denies any medication changes.  No changes to soaps or detergents.  No history of eczema.  No history of gluten sensitivity.    REVIEW OF SYSTEMS  Review of Systems   Constitutional:  Negative for chills, fever and malaise/fatigue.   Musculoskeletal:  Negative for joint pain and myalgias.   Skin:  Positive for itching and rash.       PAST MEDICAL HISTORY  Patient Active Problem List    Diagnosis Date Noted    Need for vaccination 03/07/2024    BMI 27.0-27.9,adult 03/07/2024    Dyslipidemia 08/15/2023    Prediabetes 08/15/2023    Essential hypertension 08/25/2022    Chewing tobacco use 08/25/2022       SURGICAL HISTORY  patient denies any surgical history    ALLERGIES  No Known Allergies    CURRENT MEDICATIONS  Home Medications       Reviewed by Kush Davison P.A.-C. (Physician Assistant) on 05/29/24 at 1330  Med List Status: <None>     Medication Last Dose Status   cyclobenzaprine (FLEXERIL) 5 mg tablet Not Taking Active   lisinopril (PRINIVIL) 20 MG Tab Taking Active   metoprolol SR (TOPROL  "XL) 100 MG TABLET SR 24 HR Taking Active   permethrin (ELIMITE) 5 % Cream  Active   rosuvastatin (CRESTOR) 20 MG Tab Taking Active                    SOCIAL HISTORY  Social History     Tobacco Use    Smoking status: Never    Smokeless tobacco: Current     Types: Chew   Vaping Use    Vaping status: Never Used   Substance and Sexual Activity    Alcohol use: Yes     Alcohol/week: 3.0 oz     Types: 5 Cans of beer per week    Drug use: Not Currently    Sexual activity: Not on file       FAMILY HISTORY  Family History   Problem Relation Age of Onset    Cancer Father         prostate          Objective:     VITAL SIGNS: /72 (BP Location: Right arm, Patient Position: Sitting, BP Cuff Size: Adult long)   Pulse 74   Temp 36 °C (96.8 °F) (Temporal)   Resp 16   Ht 1.727 m (5' 8\")   Wt 78 kg (172 lb)   SpO2 98%   BMI 26.15 kg/m²     PHYSICAL EXAM  Physical Exam  Constitutional:       General: He is not in acute distress.     Appearance: Normal appearance. He is not ill-appearing, toxic-appearing or diaphoretic.   HENT:      Head: Normocephalic and atraumatic.   Eyes:      Conjunctiva/sclera: Conjunctivae normal.   Cardiovascular:      Rate and Rhythm: Normal rate and regular rhythm.      Pulses: Normal pulses.      Heart sounds: Normal heart sounds.   Pulmonary:      Effort: Pulmonary effort is normal.   Musculoskeletal:      Cervical back: Normal range of motion.   Skin:     Findings: Rash present.      Comments: Patient has a diffuse papular/scabbing rash on his bilateral upper and lower extremities as well as chest, back and neck.  There are excoriations present which are difficult to discern from potential burrows in some instances.  No signs of secondary bacterial infection.  No involvement of the webspaces.   Neurological:      General: No focal deficit present.      Mental Status: He is alert and oriented to person, place, and time. Mental status is at baseline.         Assessment/Plan:     1. Rash and " nonspecific skin eruption  - Ivermectin 3 MG Tab; Take 6 tablets p.o. on day 1.  Take 6 tablets p.o. 10 days later.  Dispense: 12 Tablet; Refill: 0  - methylPREDNISolone (MEDROL DOSEPAK) 4 MG Tablet Therapy Pack; Follow schedule on package instructions.  Dispense: 21 Tablet; Refill: 0      MDM/Comments:    Patient dealing with a persistent pruritic rash for the last month.  No clear trigger at this time.  He has previously underwent a course of permethrin without any significant improvement.  Current differentials include eczema versus dermatitis herpetiformis versus scabies.  We elected to begin empiric treatment once again for scabies with a course of ivermectin.  Will also start on a course of Medrol.  Advised using topical moisturizing cream such as Aquaphor or Eucerin.  Wash all bedding and clothing.    Differential diagnosis, natural history, supportive care, and indications for immediate follow-up discussed. All questions answered. Patient agrees with the plan of care.    Follow-up as needed if symptoms worsen or fail to improve to PCP, Urgent care or Emergency Room.    I have personally reviewed prior external notes and test results pertinent to today's visit.  I have independently reviewed and interpreted all diagnostics ordered during this urgent care acute visit.   Discussed management options (risks,benefits, and alternatives to treatment). Pt expresses understanding and the treatment plan was agreed upon. Questions were encouraged and answered to pt's satisfaction.    Please note that this dictation was created using voice recognition software. I have made a reasonable attempt to correct obvious errors, but I expect that there are errors of grammar and possibly content that I did not discover before finalizing the note.

## 2024-06-18 ENCOUNTER — OFFICE VISIT (OUTPATIENT)
Dept: MEDICAL GROUP | Facility: PHYSICIAN GROUP | Age: 59
End: 2024-06-18
Payer: COMMERCIAL

## 2024-06-18 VITALS
BODY MASS INDEX: 26.46 KG/M2 | SYSTOLIC BLOOD PRESSURE: 110 MMHG | DIASTOLIC BLOOD PRESSURE: 66 MMHG | OXYGEN SATURATION: 97 % | TEMPERATURE: 98.4 F | HEART RATE: 74 BPM | WEIGHT: 174.6 LBS | HEIGHT: 68 IN

## 2024-06-18 DIAGNOSIS — M10.9 ACUTE GOUT, UNSPECIFIED CAUSE, UNSPECIFIED SITE: ICD-10-CM

## 2024-06-18 DIAGNOSIS — R21 RASH: ICD-10-CM

## 2024-06-18 DIAGNOSIS — E78.5 DYSLIPIDEMIA: Chronic | ICD-10-CM

## 2024-06-18 DIAGNOSIS — Z72.0 CHEWING TOBACCO USE: ICD-10-CM

## 2024-06-18 DIAGNOSIS — R73.03 PREDIABETES: Chronic | ICD-10-CM

## 2024-06-18 DIAGNOSIS — I10 ESSENTIAL HYPERTENSION: Chronic | ICD-10-CM

## 2024-06-18 DIAGNOSIS — Z11.59 NEED FOR HEPATITIS C SCREENING TEST: ICD-10-CM

## 2024-06-18 DIAGNOSIS — Z12.5 SCREENING FOR MALIGNANT NEOPLASM OF PROSTATE: ICD-10-CM

## 2024-06-18 PROCEDURE — 3078F DIAST BP <80 MM HG: CPT | Performed by: INTERNAL MEDICINE

## 2024-06-18 PROCEDURE — 99214 OFFICE O/P EST MOD 30 MIN: CPT | Performed by: INTERNAL MEDICINE

## 2024-06-18 PROCEDURE — 3074F SYST BP LT 130 MM HG: CPT | Performed by: INTERNAL MEDICINE

## 2024-06-18 RX ORDER — TRIAMCINOLONE ACETONIDE 1 MG/G
CREAM TOPICAL
Qty: 80 G | Refills: 2 | Status: SHIPPED | OUTPATIENT
Start: 2024-06-18

## 2024-06-18 RX ORDER — PREDNISONE 10 MG/1
TABLET ORAL
Qty: 20 TABLET | Refills: 0 | Status: SHIPPED | OUTPATIENT
Start: 2024-06-18 | End: 2024-06-25

## 2024-06-18 RX ORDER — HYDROXYZINE HYDROCHLORIDE 10 MG/1
10 TABLET, FILM COATED ORAL 2 TIMES DAILY PRN
Qty: 60 TABLET | Refills: 2 | Status: SHIPPED | OUTPATIENT
Start: 2024-06-18

## 2024-06-18 NOTE — PROGRESS NOTES
PRIMARY CARE CLINIC VISIT        Chief Complaint   Patient presents with    Gout    Rash      Rash  Gout  Follow-up hypertension  Hyperlipidemia  Prediabetes  Chewing tobacco  Overweight        History of Present Illness     Essential hypertension  This is a chronic condition.  Patient presently taking lisinopril and metoprolol..  Patient tolerated treatment well.    Dyslipidemia  Chronic condition.  Patient presently being treated with Crestor daily.  The patient tolerated the treatment well  Patient is due for lab test.    Prediabetes  Chronic condition.  Patient currently on diet therapy.  Patient is due for lab test.    Chewing tobacco use  Chronic condition.  The patient chews tobacco.  Strongly advised the patient to discontinue.    BMI 27.0-27.9,adult  Chronic , stable  Recommend pt to follow a healthy , well balance diet  Pt to continue with regular exercise activity and to maintain ideal weight.     Rash  This is a new condition.  The patient reported a rash noted on his chest back and upper extremities since the last 4 weeks.  It is very itchy in nature.  The patient stated that he was seen in the urgent care and was given prescription to treat mites and ivermectin.  No improvement noted per patient report.  Denies recent change in his medication.  No travel history.  No exposure to anyone with similar rash.  No recent change in his diet topical products, detergent no new clothing.    Gout  Chronic recurring condition.  The patient reported flareup of gout recently affecting the right foot since last couple weeks.  He denies fever or chills.  He has been taking over-the-counter medication without improvement.  Patient denied recent trauma or injury.    Current Outpatient Medications on File Prior to Visit   Medication Sig Dispense Refill    lisinopril (PRINIVIL) 20 MG Tab Take 1 Tablet by mouth every day. 90 Tablet 3    metoprolol SR (TOPROL XL) 100 MG TABLET SR 24 HR Take 1 Tablet by mouth every day. 90  Tablet 3    rosuvastatin (CRESTOR) 20 MG Tab Take 1 Tablet by mouth every evening. 90 Tablet 3    cyclobenzaprine (FLEXERIL) 5 mg tablet Take 1-2 Tablets by mouth at bedtime as needed for Moderate Pain or Muscle Spasms. 10 Tablet 0     No current facility-administered medications on file prior to visit.        Allergies: Patient has no known allergies.    Current Outpatient Medications Ordered in Epic   Medication Sig Dispense Refill    predniSONE (DELTASONE) 10 MG Tab Take 4 Tablets by mouth every day for 2 days, THEN 3 Tablets every day for 2 days, THEN 2 Tablets every day for 2 days, THEN 1 Tablet every day for 2 days. 20 Tablet 0    triamcinolone acetonide (KENALOG) 0.1 % Cream Apply to affected areas bid prn sparingly for up to 14 days. 80 g 2    hydrOXYzine HCl (ATARAX) 10 MG Tab Take 1 Tablet by mouth 2 times a day as needed for Itching. 60 Tablet 2    lisinopril (PRINIVIL) 20 MG Tab Take 1 Tablet by mouth every day. 90 Tablet 3    metoprolol SR (TOPROL XL) 100 MG TABLET SR 24 HR Take 1 Tablet by mouth every day. 90 Tablet 3    rosuvastatin (CRESTOR) 20 MG Tab Take 1 Tablet by mouth every evening. 90 Tablet 3    cyclobenzaprine (FLEXERIL) 5 mg tablet Take 1-2 Tablets by mouth at bedtime as needed for Moderate Pain or Muscle Spasms. 10 Tablet 0     No current Epic-ordered facility-administered medications on file.       No past medical history on file.    No past surgical history on file.    Family History   Problem Relation Age of Onset    Cancer Father         prostate       Social History     Tobacco Use   Smoking Status Never   Smokeless Tobacco Current    Types: Chew       Social History     Substance and Sexual Activity   Alcohol Use Yes    Alcohol/week: 3.0 oz    Types: 5 Cans of beer per week       Review of systems  As per HPI above. All other systems reviewed and negative.      Past Medical, Social, and Family history reviewed and updated in [facility]       LAB DATA:    Lab Results   Component Value  "Date/Time    HBA1C 5.7 (H) 08/14/2023 01:49 PM       No results found for: \"WBC\", \"HEMOGLOBIN\", \"HEMATOCRIT\", \"MCV\", \"PLATELETCT\"    Lab Results   Component Value Date/Time    SODIUM 141 08/14/2023 01:49 PM    POTASSIUM 4.6 08/14/2023 01:49 PM    GLUCOSE 101 (H) 08/14/2023 01:49 PM    BUN 8 08/14/2023 01:49 PM    CREATININE 1.12 08/14/2023 01:49 PM       Lab Results   Component Value Date/Time    CHOLSTRLTOT 252 (H) 08/14/2023 01:49 PM    TRIGLYCERIDE 146 08/14/2023 01:49 PM    HDL 46 08/14/2023 01:49 PM     (H) 08/14/2023 01:49 PM       Lab Results   Component Value Date/Time    ALTSGPT 33 08/14/2023 01:49 PM          Objective     /66 (BP Location: Right arm, Patient Position: Sitting, BP Cuff Size: Adult)   Pulse 74   Temp 36.9 °C (98.4 °F) (Temporal)   Ht 1.727 m (5' 8\")   Wt 79.2 kg (174 lb 9.6 oz)   SpO2 97%    Body mass index is 26.55 kg/m².    General: alert in no apparent distress.  Cardiovascular: regular rate and rhythm  Pulmonary: lungs : no wheezing   Gastrointestinal: BS present.   Skin showed macular papular rash throughout the chest (low back and both upper extremities.    Foot mild pain noted on the dorsum of the foot no significant swelling redness or deformity.  Peripheral pulses present at dorsalis pedis and posterior tibialis.  Range of motion of the ankle normal    Assessment and Plan     1. Rash  This is a new condition.  Cause unclear.  Rule out possible allergic dermatitis versus others  Recommended the following  - Referral to Dermatology  - predniSONE (DELTASONE) 10 MG Tab; Take 4 Tablets by mouth every day for 2 days, THEN 3 Tablets every day for 2 days, THEN 2 Tablets every day for 2 days, THEN 1 Tablet every day for 2 days.  Dispense: 20 Tablet; Refill: 0  - triamcinolone acetonide (KENALOG) 0.1 % Cream; Apply to affected areas bid prn sparingly for up to 14 days.  Dispense: 80 g; Refill: 2  - hydrOXYzine HCl (ATARAX) 10 MG Tab; Take 1 Tablet by mouth 2 times a day as " needed for Itching.  Dispense: 60 Tablet; Refill: 2    2.  Gout  Chronic recurrent condition.  Recommended the following  - predniSONE (DELTASONE) 10 MG Tab; Take 4 Tablets by mouth every day for 2 days, THEN 3 Tablets every day for 2 days, THEN 2 Tablets every day for 2 days, THEN 1 Tablet every day for 2 days.  Dispense: 20 Tablet; Refill: 0  - URIC ACID; Future  - DX-FOOT-COMPLETE 3+ RIGHT; Future  Follow-up after treatment completion  Patient to go to ER if symptoms worsen or any change in his condition    3. Essential hypertension  - CBC WITHOUT DIFFERENTIAL; Future  Chronic stable condition.  Continue lisinopril 20 Mg daily and metoprolol 100 mg daily.    4. Dyslipidemia  - ALANINE AMINO-TRANS; Future  - Lipid Profile; Future  Chronic condition.  Current status unclear.  Lab test ordered to check lipid panel.  Continue Crestor 20 Mg daily    5. Prediabetes  - HEMOGLOBIN A1C; Future  - Basic Metabolic Panel; Future  Chronic condition.  Current status unclear.  Lab tests ordered to check A1c.  Patient to continue with low sweet low-carb diet.    6. Chewing tobacco use  Chronic condition.  Uncontrolled.  Advised the patient to discontinue chewing tobacco    7. BMI 27.0-27.9,adult  Chronic condition.  Uncontrolled but recommend diet and lifestyle modification.  Encouraged patient to maintain ideal weight.              Attestation: I spent:   33  minutes -    That includes time for chart review before the visit, the actual patient visit, and time spent on documentation in EMR after the visit.  Chart review/prep, review of other providers' records, imaging/lab review, face-to-face time for history/examination, pt's counseling/education, ordering, prescribing,  review of results/meds/ treatment plan with patient, and care coordination.           Please note that this dictation was created using voice recognition software. I have made every reasonable attempt to correct obvious errors, but I expect that there are  errors of grammar and possibly content that I did not discover before finalizing the note.    Aba Ferrari MD  Internal Medicine  [facility]

## 2024-06-18 NOTE — ASSESSMENT & PLAN NOTE
This is a chronic condition.  Patient presently taking lisinopril and metoprolol..  Patient tolerated treatment well.

## 2024-06-18 NOTE — ASSESSMENT & PLAN NOTE
This is a new condition.  The patient reported a rash noted on his chest back and upper extremities since the last 4 weeks.  It is very itchy in nature.  The patient stated that he was seen in the urgent care and was given prescription to treat mites and ivermectin.  No improvement noted per patient report.  Denies recent change in his medication.  No travel history.  No exposure to anyone with similar rash.  No recent change in his diet topical products, detergent no new clothing.

## 2024-06-18 NOTE — ASSESSMENT & PLAN NOTE
Chronic condition.  Patient presently being treated with Crestor daily.  The patient tolerated the treatment well  Patient is due for lab test.

## 2024-06-19 ENCOUNTER — HOSPITAL ENCOUNTER (OUTPATIENT)
Dept: LAB | Facility: MEDICAL CENTER | Age: 59
End: 2024-06-19
Attending: INTERNAL MEDICINE
Payer: COMMERCIAL

## 2024-06-19 DIAGNOSIS — Z12.5 SCREENING FOR MALIGNANT NEOPLASM OF PROSTATE: ICD-10-CM

## 2024-06-19 DIAGNOSIS — R73.03 PREDIABETES: Chronic | ICD-10-CM

## 2024-06-19 DIAGNOSIS — E78.5 DYSLIPIDEMIA: Chronic | ICD-10-CM

## 2024-06-19 DIAGNOSIS — Z11.59 NEED FOR HEPATITIS C SCREENING TEST: ICD-10-CM

## 2024-06-19 DIAGNOSIS — M10.9 ACUTE GOUT, UNSPECIFIED CAUSE, UNSPECIFIED SITE: ICD-10-CM

## 2024-06-19 DIAGNOSIS — I10 ESSENTIAL HYPERTENSION: Chronic | ICD-10-CM

## 2024-06-19 LAB
ALT SERPL-CCNC: 25 U/L (ref 2–50)
ANION GAP SERPL CALC-SCNC: 9 MMOL/L (ref 7–16)
BUN SERPL-MCNC: 7 MG/DL (ref 8–22)
CALCIUM SERPL-MCNC: 9.8 MG/DL (ref 8.5–10.5)
CHLORIDE SERPL-SCNC: 103 MMOL/L (ref 96–112)
CHOLEST SERPL-MCNC: 147 MG/DL (ref 100–199)
CO2 SERPL-SCNC: 23 MMOL/L (ref 20–33)
CREAT SERPL-MCNC: 0.84 MG/DL (ref 0.5–1.4)
ERYTHROCYTE [DISTWIDTH] IN BLOOD BY AUTOMATED COUNT: 45.4 FL (ref 35.9–50)
EST. AVERAGE GLUCOSE BLD GHB EST-MCNC: 120 MG/DL
GFR SERPLBLD CREATININE-BSD FMLA CKD-EPI: 101 ML/MIN/1.73 M 2
GLUCOSE SERPL-MCNC: 118 MG/DL (ref 65–99)
HBA1C MFR BLD: 5.8 % (ref 4–5.6)
HCT VFR BLD AUTO: 36.4 % (ref 42–52)
HCV AB SER QL: NORMAL
HDLC SERPL-MCNC: 29 MG/DL
HGB BLD-MCNC: 12.1 G/DL (ref 14–18)
HIV 1+2 AB+HIV1 P24 AG SERPL QL IA: NORMAL
LDLC SERPL CALC-MCNC: 95 MG/DL
MCH RBC QN AUTO: 33.3 PG (ref 27–33)
MCHC RBC AUTO-ENTMCNC: 33.2 G/DL (ref 32.3–36.5)
MCV RBC AUTO: 100.3 FL (ref 81.4–97.8)
PLATELET # BLD AUTO: 325 K/UL (ref 164–446)
PMV BLD AUTO: 9.7 FL (ref 9–12.9)
POTASSIUM SERPL-SCNC: 4.4 MMOL/L (ref 3.6–5.5)
PSA SERPL-MCNC: 0.74 NG/ML (ref 0–4)
RBC # BLD AUTO: 3.63 M/UL (ref 4.7–6.1)
SODIUM SERPL-SCNC: 135 MMOL/L (ref 135–145)
TRIGL SERPL-MCNC: 115 MG/DL (ref 0–149)
URATE SERPL-MCNC: 5.4 MG/DL (ref 2.5–8.3)
WBC # BLD AUTO: 6.8 K/UL (ref 4.8–10.8)

## 2024-06-19 PROCEDURE — 84550 ASSAY OF BLOOD/URIC ACID: CPT

## 2024-06-19 PROCEDURE — 87389 HIV-1 AG W/HIV-1&-2 AB AG IA: CPT

## 2024-06-19 PROCEDURE — 84153 ASSAY OF PSA TOTAL: CPT

## 2024-06-19 PROCEDURE — 83036 HEMOGLOBIN GLYCOSYLATED A1C: CPT

## 2024-06-19 PROCEDURE — 80048 BASIC METABOLIC PNL TOTAL CA: CPT

## 2024-06-19 PROCEDURE — 36415 COLL VENOUS BLD VENIPUNCTURE: CPT

## 2024-06-19 PROCEDURE — 84460 ALANINE AMINO (ALT) (SGPT): CPT

## 2024-06-19 PROCEDURE — 85027 COMPLETE CBC AUTOMATED: CPT

## 2024-06-19 PROCEDURE — 86803 HEPATITIS C AB TEST: CPT

## 2024-06-19 PROCEDURE — 80061 LIPID PANEL: CPT

## 2024-06-20 DIAGNOSIS — D64.9 ANEMIA, UNSPECIFIED TYPE: ICD-10-CM

## 2024-07-01 ENCOUNTER — TELEPHONE (OUTPATIENT)
Dept: MEDICAL GROUP | Facility: PHYSICIAN GROUP | Age: 59
End: 2024-07-01
Payer: COMMERCIAL

## 2024-07-09 ENCOUNTER — OFFICE VISIT (OUTPATIENT)
Dept: MEDICAL GROUP | Facility: PHYSICIAN GROUP | Age: 59
End: 2024-07-09
Payer: COMMERCIAL

## 2024-07-09 VITALS
SYSTOLIC BLOOD PRESSURE: 110 MMHG | HEART RATE: 55 BPM | HEIGHT: 68 IN | TEMPERATURE: 97.7 F | DIASTOLIC BLOOD PRESSURE: 66 MMHG | OXYGEN SATURATION: 99 % | BODY MASS INDEX: 26.05 KG/M2 | WEIGHT: 171.9 LBS

## 2024-07-09 DIAGNOSIS — Z28.21 VACCINATION REFUSED BY PATIENT: ICD-10-CM

## 2024-07-09 DIAGNOSIS — R73.03 PREDIABETES: Chronic | ICD-10-CM

## 2024-07-09 DIAGNOSIS — E78.5 DYSLIPIDEMIA: Chronic | ICD-10-CM

## 2024-07-09 DIAGNOSIS — D64.9 ANEMIA, UNSPECIFIED TYPE: ICD-10-CM

## 2024-07-09 DIAGNOSIS — Z12.11 COLON CANCER SCREENING: ICD-10-CM

## 2024-07-09 DIAGNOSIS — Z72.0 CHEWING TOBACCO USE: ICD-10-CM

## 2024-07-09 DIAGNOSIS — M10.9 ACUTE GOUT, UNSPECIFIED CAUSE, UNSPECIFIED SITE: ICD-10-CM

## 2024-07-09 DIAGNOSIS — I10 ESSENTIAL HYPERTENSION: Chronic | ICD-10-CM

## 2024-07-09 PROBLEM — R21 RASH: Status: RESOLVED | Noted: 2024-06-18 | Resolved: 2024-07-09

## 2024-07-09 PROCEDURE — 3074F SYST BP LT 130 MM HG: CPT | Performed by: INTERNAL MEDICINE

## 2024-07-09 PROCEDURE — 99214 OFFICE O/P EST MOD 30 MIN: CPT | Performed by: INTERNAL MEDICINE

## 2024-07-09 PROCEDURE — 3078F DIAST BP <80 MM HG: CPT | Performed by: INTERNAL MEDICINE

## 2024-09-17 ENCOUNTER — APPOINTMENT (RX ONLY)
Dept: URBAN - METROPOLITAN AREA CLINIC 4 | Facility: CLINIC | Age: 59
Setting detail: DERMATOLOGY
End: 2024-09-17

## 2024-09-17 DIAGNOSIS — L20.89 OTHER ATOPIC DERMATITIS: ICD-10-CM

## 2024-09-17 PROBLEM — L30.9 DERMATITIS, UNSPECIFIED: Status: ACTIVE | Noted: 2024-09-17

## 2024-09-17 PROCEDURE — 99203 OFFICE O/P NEW LOW 30 MIN: CPT

## 2024-09-17 PROCEDURE — ? COUNSELING

## 2024-09-17 PROCEDURE — ? PRESCRIPTION

## 2024-09-17 RX ORDER — CLOBETASOL PROPIONATE 0.5 MG/G
CREAM TOPICAL BID
Qty: 120 | Refills: 1 | Status: ERX | COMMUNITY
Start: 2024-09-17

## 2024-09-17 RX ADMIN — CLOBETASOL PROPIONATE: 0.5 CREAM TOPICAL at 00:00

## 2024-09-17 ASSESSMENT — LOCATION DETAILED DESCRIPTION DERM
LOCATION DETAILED: PERIUMBILICAL SKIN
LOCATION DETAILED: LEFT VENTRAL PROXIMAL FOREARM
LOCATION DETAILED: RIGHT MEDIAL INFERIOR CHEST
LOCATION DETAILED: RIGHT VENTRAL PROXIMAL FOREARM
LOCATION DETAILED: LEFT MEDIAL TRAPEZIAL NECK

## 2024-09-17 ASSESSMENT — LOCATION SIMPLE DESCRIPTION DERM
LOCATION SIMPLE: POSTERIOR NECK
LOCATION SIMPLE: RIGHT FOREARM
LOCATION SIMPLE: CHEST
LOCATION SIMPLE: LEFT FOREARM
LOCATION SIMPLE: ABDOMEN

## 2024-09-17 ASSESSMENT — LOCATION ZONE DERM
LOCATION ZONE: ARM
LOCATION ZONE: TRUNK
LOCATION ZONE: NECK

## 2024-11-12 ENCOUNTER — APPOINTMENT (RX ONLY)
Dept: URBAN - METROPOLITAN AREA CLINIC 15 | Facility: CLINIC | Age: 59
Setting detail: DERMATOLOGY
End: 2024-11-12

## 2024-11-12 DIAGNOSIS — L28.1 PRURIGO NODULARIS: ICD-10-CM | Status: INADEQUATELY CONTROLLED

## 2024-11-12 PROBLEM — L30.9 DERMATITIS, UNSPECIFIED: Status: ACTIVE | Noted: 2024-11-12

## 2024-11-12 PROCEDURE — 11900 INJECT SKIN LESIONS </W 7: CPT

## 2024-11-12 PROCEDURE — ? BIOPSY BY PUNCH METHOD

## 2024-11-12 PROCEDURE — ? SEPARATE AND IDENTIFIABLE DOCUMENTATION

## 2024-11-12 PROCEDURE — ? ADDITIONAL NOTES

## 2024-11-12 PROCEDURE — ? INTRALESIONAL KENALOG

## 2024-11-12 PROCEDURE — 11105 PUNCH BX SKIN EA SEP/ADDL: CPT

## 2024-11-12 PROCEDURE — ? PRESCRIPTION

## 2024-11-12 PROCEDURE — ? COUNSELING

## 2024-11-12 PROCEDURE — 99214 OFFICE O/P EST MOD 30 MIN: CPT | Mod: 25

## 2024-11-12 PROCEDURE — 11104 PUNCH BX SKIN SINGLE LESION: CPT

## 2024-11-12 RX ORDER — HALOBETASOL PROPIONATE 0.5 MG/G
CREAM TOPICAL
Qty: 150 | Refills: 3 | Status: ERX | COMMUNITY
Start: 2024-11-12

## 2024-11-12 RX ORDER — TACROLIMUS 1 MG/G
OINTMENT TOPICAL
Qty: 100 | Refills: 0 | Status: ERX | COMMUNITY
Start: 2024-11-12

## 2024-11-12 RX ORDER — CLOBETASOL PROPIONATE 0.5 MG/ML
SOLUTION TOPICAL
Qty: 50 | Refills: 3 | Status: ERX | COMMUNITY
Start: 2024-11-12

## 2024-11-12 RX ADMIN — CLOBETASOL PROPIONATE: 0.5 SOLUTION TOPICAL at 00:00

## 2024-11-12 RX ADMIN — HALOBETASOL PROPIONATE: 0.5 CREAM TOPICAL at 00:00

## 2024-11-12 RX ADMIN — TACROLIMUS: 1 OINTMENT TOPICAL at 00:00

## 2024-11-12 ASSESSMENT — LOCATION DETAILED DESCRIPTION DERM
LOCATION DETAILED: RIGHT ANTERIOR LATERAL DISTAL UPPER ARM
LOCATION DETAILED: INFERIOR LUMBAR SPINE
LOCATION DETAILED: RIGHT ANTERIOR LATERAL PROXIMAL UPPER ARM
LOCATION DETAILED: LEFT VENTRAL PROXIMAL FOREARM
LOCATION DETAILED: LEFT ANTERIOR LATERAL DISTAL UPPER ARM
LOCATION DETAILED: RIGHT PROXIMAL DORSAL FOREARM
LOCATION DETAILED: HAIR
LOCATION DETAILED: RIGHT VENTRAL PROXIMAL FOREARM
LOCATION DETAILED: PERIUMBILICAL SKIN
LOCATION DETAILED: RIGHT ANTERIOR SHOULDER
LOCATION DETAILED: LEFT PROXIMAL DORSAL FOREARM
LOCATION DETAILED: SUPERIOR THORACIC SPINE
LOCATION DETAILED: LEFT PROXIMAL PRETIBIAL REGION
LOCATION DETAILED: LEFT ANTERIOR SHOULDER
LOCATION DETAILED: RIGHT PROXIMAL PRETIBIAL REGION

## 2024-11-12 ASSESSMENT — LOCATION ZONE DERM
LOCATION ZONE: ARM
LOCATION ZONE: ARM
LOCATION ZONE: LEG
LOCATION ZONE: TRUNK
LOCATION ZONE: SCALP

## 2024-11-12 ASSESSMENT — LOCATION SIMPLE DESCRIPTION DERM
LOCATION SIMPLE: LEFT PRETIBIAL REGION
LOCATION SIMPLE: LOWER BACK
LOCATION SIMPLE: RIGHT PRETIBIAL REGION
LOCATION SIMPLE: LEFT UPPER ARM
LOCATION SIMPLE: LEFT FOREARM
LOCATION SIMPLE: RIGHT SHOULDER
LOCATION SIMPLE: LEFT SHOULDER
LOCATION SIMPLE: RIGHT FOREARM
LOCATION SIMPLE: HAIR
LOCATION SIMPLE: UPPER BACK
LOCATION SIMPLE: ABDOMEN
LOCATION SIMPLE: RIGHT UPPER ARM

## 2024-11-12 ASSESSMENT — ITCH INTENSITY: HOW SEVERE IS YOUR ITCHING?: 9

## 2024-11-12 NOTE — PROCEDURE: ADDITIONAL NOTES
Detail Level: Simple
Render Risk Assessment In Note?: no
Additional Notes: Will plan on Dupixent at next visit if he fails this regimen

## 2024-11-12 NOTE — PROCEDURE: INTRALESIONAL KENALOG
Administered By (Optional): SARAH SHEFFIELD
Detail Level: Detailed
Ndc# For Kenalog Only: 6573-8793-59
How Many Mls Were Removed From The 80 Mg/Ml (5ml) Vial When Preparing The Injectable Solution?: 0
Require Ndc Code?: No
Total Volume (Ccs): 2
Medical Necessity Clause: This procedure was medically necessary because the lesions that were treated were:
Concentration Of Kenalog Solution Injected (Mg/Ml): 40.0
Expiration Date For Kenalog (Optional): May 2026
Validate Note Data When Using Inventory: Yes
Kenalog Type Of Vial: Multiple Dose
Which Kenalog Vial Was Used?: Kenalog 40 mg/ml (10 ml vial)
Kenalog Preparation: Kenalog
Lot # For Kenalog (Optional): [id number]
Consent: The risks of atrophy were reviewed with the patient.

## 2024-11-12 NOTE — HPI: MEDICATION (DUPIXENT)
Is This A New Presentation, Or A Follow-Up?: Evaluation for Dupixent Therapy
What Type Of Rash Do You Have?: other
How Severe Is It?: severe
Additional History: He has tried and failed triamcinolone and clobetasol x 6 weeks total without success over the last few months. He has also tried and failed oral prednisone and antihistamines without success. Prior to the rash and itch, he has not started any new medications. He denies drug abuse or recreational drug use.

## 2024-11-21 ENCOUNTER — APPOINTMENT (RX ONLY)
Dept: URBAN - METROPOLITAN AREA CLINIC 15 | Facility: CLINIC | Age: 59
Setting detail: DERMATOLOGY
End: 2024-11-21

## 2024-11-21 DIAGNOSIS — L28.1 PRURIGO NODULARIS: ICD-10-CM

## 2024-11-21 PROBLEM — L30.9 DERMATITIS, UNSPECIFIED: Status: ACTIVE | Noted: 2024-11-21

## 2024-11-21 PROCEDURE — ? PRESCRIPTION

## 2024-11-21 PROCEDURE — ? COUNSELING

## 2024-11-21 PROCEDURE — ? BIOPSY BY SHAVE METHOD

## 2024-11-21 PROCEDURE — 11102 TANGNTL BX SKIN SINGLE LES: CPT

## 2024-11-21 RX ORDER — HALOBETASOL PROPIONATE 0.5 MG/G
CREAM TOPICAL
Qty: 100 | Refills: 3 | Status: ERX

## 2024-11-21 RX ORDER — CLOBETASOL PROPIONATE 0.5 MG/ML
SOLUTION TOPICAL
Qty: 50 | Refills: 3 | Status: ERX

## 2024-11-21 ASSESSMENT — LOCATION SIMPLE DESCRIPTION DERM
LOCATION SIMPLE: LEFT SHOULDER
LOCATION SIMPLE: RIGHT FOREARM
LOCATION SIMPLE: LEFT PRETIBIAL REGION
LOCATION SIMPLE: LEFT FOREARM
LOCATION SIMPLE: ABDOMEN
LOCATION SIMPLE: RIGHT SHOULDER
LOCATION SIMPLE: LOWER BACK
LOCATION SIMPLE: UPPER BACK
LOCATION SIMPLE: RIGHT PRETIBIAL REGION
LOCATION SIMPLE: RIGHT THIGH

## 2024-11-21 ASSESSMENT — LOCATION DETAILED DESCRIPTION DERM
LOCATION DETAILED: INFERIOR LUMBAR SPINE
LOCATION DETAILED: RIGHT ANTERIOR LATERAL PROXIMAL THIGH
LOCATION DETAILED: PERIUMBILICAL SKIN
LOCATION DETAILED: LEFT ANTERIOR SHOULDER
LOCATION DETAILED: LEFT VENTRAL PROXIMAL FOREARM
LOCATION DETAILED: RIGHT ANTERIOR SHOULDER
LOCATION DETAILED: LEFT PROXIMAL PRETIBIAL REGION
LOCATION DETAILED: SUPERIOR THORACIC SPINE
LOCATION DETAILED: RIGHT PROXIMAL DORSAL FOREARM
LOCATION DETAILED: RIGHT PROXIMAL PRETIBIAL REGION
LOCATION DETAILED: RIGHT VENTRAL PROXIMAL FOREARM
LOCATION DETAILED: LEFT PROXIMAL DORSAL FOREARM

## 2024-11-21 ASSESSMENT — LOCATION ZONE DERM
LOCATION ZONE: LEG
LOCATION ZONE: ARM
LOCATION ZONE: TRUNK
LOCATION ZONE: LEG

## 2024-11-21 NOTE — PROCEDURE: BIOPSY BY SHAVE METHOD
Detail Level: Detailed
Depth Of Biopsy: dermis
Was A Bandage Applied: Yes
Size Of Lesion In Cm: 0
Biopsy Type: DIF
Biopsy Method: Dermablade
Anesthesia Type: 1% lidocaine with epinephrine
Anesthesia Volume In Cc: 0.5
Hemostasis: Electrocautery
Wound Care: Petrolatum
Dressing: bandage
Destruction After The Procedure: No
Type Of Destruction Used: Curettage
Curettage Text: The wound bed was treated with curettage after the biopsy was performed.
Cryotherapy Text: The wound bed was treated with cryotherapy after the biopsy was performed.
Electrodesiccation Text: The wound bed was treated with electrodesiccation after the biopsy was performed.
Electrodesiccation And Curettage Text: The wound bed was treated with electrodesiccation and curettage after the biopsy was performed.
Silver Nitrate Text: The wound bed was treated with silver nitrate after the biopsy was performed.
Lab: [id number]
Lab Facility: 
Path Notes (To The Dermatopathologist): Perilesional shave. Treated for scabies twice with permethrin and ivermectin.
Consent: Written consent was obtained and risks were reviewed including but not limited to scarring, infection, bleeding, scabbing, incomplete removal, nerve damage and allergy to anesthesia.
Post-Care Instructions: I reviewed with the patient in detail post-care instructions. Patient is to keep the biopsy site dry overnight, and then apply bacitracin twice daily until healed. Patient may apply hydrogen peroxide soaks to remove any crusting.
Notification Instructions: Patient will be notified of biopsy results. However, patient instructed to call the office if not contacted within 2 weeks.
Billing Type: Third-Party Bill
Information: Selecting Yes will display possible errors in your note based on the variables you have selected. This validation is only offered as a suggestion for you. PLEASE NOTE THAT THE VALIDATION TEXT WILL BE REMOVED WHEN YOU FINALIZE YOUR NOTE. IF YOU WANT TO FAX A PRELIMINARY NOTE YOU WILL NEED TO TOGGLE THIS TO 'NO' IF YOU DO NOT WANT IT IN YOUR FAXED NOTE.

## 2024-11-27 ENCOUNTER — APPOINTMENT (RX ONLY)
Dept: URBAN - METROPOLITAN AREA CLINIC 15 | Facility: CLINIC | Age: 59
Setting detail: DERMATOLOGY
End: 2024-11-27

## 2024-11-27 DIAGNOSIS — L28.1 PRURIGO NODULARIS: ICD-10-CM

## 2024-11-27 DIAGNOSIS — Z48.02 ENCOUNTER FOR REMOVAL OF SUTURES: ICD-10-CM

## 2024-11-27 PROBLEM — L30.9 DERMATITIS, UNSPECIFIED: Status: ACTIVE | Noted: 2024-11-27

## 2024-11-27 PROCEDURE — ? COUNSELING

## 2024-11-27 PROCEDURE — ? SUTURE REMOVAL (GLOBAL PERIOD)

## 2024-11-27 PROCEDURE — ? ADDITIONAL NOTES

## 2024-11-27 PROCEDURE — 99024 POSTOP FOLLOW-UP VISIT: CPT

## 2024-11-27 ASSESSMENT — LOCATION DETAILED DESCRIPTION DERM
LOCATION DETAILED: LEFT PROXIMAL DORSAL FOREARM
LOCATION DETAILED: RIGHT VENTRAL PROXIMAL FOREARM
LOCATION DETAILED: PERIUMBILICAL SKIN
LOCATION DETAILED: INFERIOR LUMBAR SPINE
LOCATION DETAILED: LEFT VENTRAL PROXIMAL FOREARM
LOCATION DETAILED: LEFT PROXIMAL PRETIBIAL REGION
LOCATION DETAILED: LEFT ANTERIOR SHOULDER
LOCATION DETAILED: RIGHT PROXIMAL PRETIBIAL REGION
LOCATION DETAILED: RIGHT ANTERIOR SHOULDER
LOCATION DETAILED: RIGHT PROXIMAL DORSAL FOREARM
LOCATION DETAILED: LEFT PROXIMAL DORSAL FOREARM
LOCATION DETAILED: SUPERIOR THORACIC SPINE

## 2024-11-27 ASSESSMENT — LOCATION SIMPLE DESCRIPTION DERM
LOCATION SIMPLE: LEFT SHOULDER
LOCATION SIMPLE: LOWER BACK
LOCATION SIMPLE: LEFT FOREARM
LOCATION SIMPLE: RIGHT SHOULDER
LOCATION SIMPLE: ABDOMEN
LOCATION SIMPLE: RIGHT PRETIBIAL REGION
LOCATION SIMPLE: LEFT FOREARM
LOCATION SIMPLE: LEFT PRETIBIAL REGION
LOCATION SIMPLE: RIGHT FOREARM
LOCATION SIMPLE: UPPER BACK

## 2024-11-27 ASSESSMENT — LOCATION ZONE DERM
LOCATION ZONE: ARM
LOCATION ZONE: ARM
LOCATION ZONE: LEG
LOCATION ZONE: TRUNK

## 2024-11-27 NOTE — PROCEDURE: ADDITIONAL NOTES
Detail Level: Simple
Additional Notes: Awaiting DIF results to r/o pre-bullous pemphigoid\\nStart antihistamine daily\\nMay use pramoxine 1% cream in addition to continuing topical corticosteroid
Render Risk Assessment In Note?: no

## 2024-11-27 NOTE — PROCEDURE: SUTURE REMOVAL (GLOBAL PERIOD)
Detail Level: Detailed
Add 05647 Cpt? (Important Note: In 2017 The Use Of 74097 Is Being Tracked By Cms To Determine Future Global Period Reimbursement For Global Periods): no

## 2025-01-06 ENCOUNTER — APPOINTMENT (OUTPATIENT)
Dept: URBAN - METROPOLITAN AREA CLINIC 15 | Facility: CLINIC | Age: 60
Setting detail: DERMATOLOGY
End: 2025-01-06

## 2025-01-06 DIAGNOSIS — L30.0 NUMMULAR DERMATITIS: ICD-10-CM

## 2025-01-06 PROCEDURE — ? COUNSELING

## 2025-01-06 PROCEDURE — 99214 OFFICE O/P EST MOD 30 MIN: CPT

## 2025-01-06 PROCEDURE — ? SEPARATE AND IDENTIFIABLE DOCUMENTATION

## 2025-01-06 PROCEDURE — ? ADDITIONAL NOTES

## 2025-01-06 PROCEDURE — ? DUPIXENT INITIATION

## 2025-01-06 NOTE — PROCEDURE: ADDITIONAL NOTES
Render Risk Assessment In Note?: no
Detail Level: Simple
Additional Notes: PA started today with GENEVIEVE Nicole. Pt to call clinic once he receives the medications to make an appointment for injection.

## 2025-01-06 NOTE — PROCEDURE: DUPIXENT INITIATION
Is Thalidomide Contraindicated?: No
Diagnosis (Required): Atopic Dermatitis/Eczematous Dermatitis
Is Methotrexate Contraindicated?: Yes
Dupixent Dosing: 600 mg SC day 0 then 300 mg SC every other week
Pregnancy And Lactation Warning Text: There have not been adverse fetal risks in women taking Dupixent while pregnant. It is unknown if this medication is excreted in breast milk.
Detail Level: Zone
Dupixent Monitoring Guidelines: There is no laboratory monitoring requirement with Dupixent.

## 2025-01-20 ENCOUNTER — APPOINTMENT (OUTPATIENT)
Dept: URBAN - METROPOLITAN AREA CLINIC 15 | Facility: CLINIC | Age: 60
Setting detail: DERMATOLOGY
End: 2025-01-20

## 2025-01-20 DIAGNOSIS — L30.0 NUMMULAR DERMATITIS: ICD-10-CM

## 2025-01-20 PROCEDURE — 99024 POSTOP FOLLOW-UP VISIT: CPT | Mod: 25

## 2025-01-20 PROCEDURE — ? SEPARATE AND IDENTIFIABLE DOCUMENTATION

## 2025-01-20 PROCEDURE — ? COUNSELING

## 2025-01-20 PROCEDURE — ? DUPIXENT INJECTION

## 2025-01-20 PROCEDURE — ? DUPIXENT INITIATION

## 2025-01-20 PROCEDURE — 96372 THER/PROPH/DIAG INJ SC/IM: CPT

## 2025-01-20 ASSESSMENT — LOCATION DETAILED DESCRIPTION DERM
LOCATION DETAILED: RIGHT ANTERIOR DISTAL THIGH
LOCATION DETAILED: LEFT ANTERIOR DISTAL THIGH

## 2025-01-20 ASSESSMENT — LOCATION ZONE DERM: LOCATION ZONE: LEG

## 2025-01-20 ASSESSMENT — LOCATION SIMPLE DESCRIPTION DERM
LOCATION SIMPLE: RIGHT THIGH
LOCATION SIMPLE: LEFT THIGH

## 2025-01-20 NOTE — PROCEDURE: DUPIXENT INJECTION
Administered By (Optional): Bonnie GUTIERREZ
Lot # (Optional): [id number]
Include J-Code In Bill: Yes
Ndc (300 Mg Prefilled Pen): 8047-8116-57
J-Code: 
Ndc (300 Mg Prefilled Syringe): 2860-8661-00
56549 Billing Preferences: 1
Detail Level: Generalized
Expiration Date (Optional): 2026-07-31
Was The Medication Purchased By The Clinic?: No
Syringe Size Used (Required For Enhanced Ndc): 300 mg/2ml prefilled pen
Dupixent Dosing: 600 mg
Ndc (200 Mg Prefilled Pen): 8103-5153-61
Consent: The risks of pain and injection site reactions were reviewed with the patient prior to the injection.
Date Of Next Injection: 2 Weeks
Ndc (200 Mg Prefilled Syringe): 2782-7664-77

## 2025-04-12 DIAGNOSIS — I10 ESSENTIAL HYPERTENSION: Chronic | ICD-10-CM

## 2025-04-14 RX ORDER — METOPROLOL SUCCINATE 100 MG/1
100 TABLET, EXTENDED RELEASE ORAL DAILY
Qty: 90 TABLET | Refills: 0 | Status: SHIPPED | OUTPATIENT
Start: 2025-04-14

## 2025-04-14 RX ORDER — LISINOPRIL 20 MG/1
20 TABLET ORAL DAILY
Qty: 90 TABLET | Refills: 0 | Status: SHIPPED | OUTPATIENT
Start: 2025-04-14

## 2025-04-14 NOTE — TELEPHONE ENCOUNTER
Received request via: Pharmacy    Was the patient seen in the last year in this department? Yes    Does the patient have an active prescription (recently filled or refills available) for medication(s) requested? No    Pharmacy Name: [id number] - [place], [place] - [address] [facility]     Does the patient have prison Plus and need 100-day supply? (This applies to ALL medications) Patient does not have SCP

## 2025-05-17 ENCOUNTER — HOSPITAL ENCOUNTER (EMERGENCY)
Facility: MEDICAL CENTER | Age: 60
End: 2025-05-17
Attending: EMERGENCY MEDICINE
Payer: COMMERCIAL

## 2025-05-17 ENCOUNTER — APPOINTMENT (OUTPATIENT)
Dept: RADIOLOGY | Facility: MEDICAL CENTER | Age: 60
End: 2025-05-17
Attending: EMERGENCY MEDICINE
Payer: COMMERCIAL

## 2025-05-17 ENCOUNTER — OFFICE VISIT (OUTPATIENT)
Dept: URGENT CARE | Facility: PHYSICIAN GROUP | Age: 60
End: 2025-05-17
Payer: COMMERCIAL

## 2025-05-17 VITALS
RESPIRATION RATE: 18 BRPM | SYSTOLIC BLOOD PRESSURE: 130 MMHG | DIASTOLIC BLOOD PRESSURE: 80 MMHG | HEIGHT: 68 IN | OXYGEN SATURATION: 96 % | WEIGHT: 163.14 LBS | BODY MASS INDEX: 24.73 KG/M2 | TEMPERATURE: 99 F | HEART RATE: 79 BPM

## 2025-05-17 VITALS
RESPIRATION RATE: 15 BRPM | HEART RATE: 77 BPM | DIASTOLIC BLOOD PRESSURE: 71 MMHG | SYSTOLIC BLOOD PRESSURE: 112 MMHG | TEMPERATURE: 98.4 F | HEIGHT: 68 IN | OXYGEN SATURATION: 94 % | WEIGHT: 169.97 LBS | BODY MASS INDEX: 25.76 KG/M2

## 2025-05-17 DIAGNOSIS — M25.422 PAIN AND SWELLING OF LEFT ELBOW: Primary | ICD-10-CM

## 2025-05-17 DIAGNOSIS — M25.522 PAIN AND SWELLING OF LEFT ELBOW: Primary | ICD-10-CM

## 2025-05-17 DIAGNOSIS — M25.422 ELBOW EFFUSION, LEFT: Primary | ICD-10-CM

## 2025-05-17 LAB
ALBUMIN SERPL BCP-MCNC: 4.1 G/DL (ref 3.2–4.9)
ALBUMIN/GLOB SERPL: 1.2 G/DL
ALP SERPL-CCNC: 78 U/L (ref 30–99)
ALT SERPL-CCNC: 17 U/L (ref 2–50)
ANION GAP SERPL CALC-SCNC: 12 MMOL/L (ref 7–16)
AST SERPL-CCNC: 20 U/L (ref 12–45)
BASOPHILS # BLD AUTO: 0.4 % (ref 0–1.8)
BASOPHILS # BLD: 0.05 K/UL (ref 0–0.12)
BILIRUB SERPL-MCNC: 0.8 MG/DL (ref 0.1–1.5)
BUN SERPL-MCNC: 11 MG/DL (ref 8–22)
CALCIUM ALBUM COR SERPL-MCNC: 9.6 MG/DL (ref 8.5–10.5)
CALCIUM SERPL-MCNC: 9.7 MG/DL (ref 8.5–10.5)
CHLORIDE SERPL-SCNC: 103 MMOL/L (ref 96–112)
CO2 SERPL-SCNC: 22 MMOL/L (ref 20–33)
CREAT SERPL-MCNC: 0.98 MG/DL (ref 0.5–1.4)
CRP SERPL HS-MCNC: 4.55 MG/DL (ref 0–0.75)
EOSINOPHIL # BLD AUTO: 0.32 K/UL (ref 0–0.51)
EOSINOPHIL NFR BLD: 2.7 % (ref 0–6.9)
ERYTHROCYTE [DISTWIDTH] IN BLOOD BY AUTOMATED COUNT: 43.3 FL (ref 35.9–50)
ERYTHROCYTE [SEDIMENTATION RATE] IN BLOOD BY WESTERGREN METHOD: 31 MM/HOUR (ref 0–20)
GFR SERPLBLD CREATININE-BSD FMLA CKD-EPI: 89 ML/MIN/1.73 M 2
GLOBULIN SER CALC-MCNC: 3.5 G/DL (ref 1.9–3.5)
GLUCOSE SERPL-MCNC: 121 MG/DL (ref 65–99)
HCT VFR BLD AUTO: 42.5 % (ref 42–52)
HGB BLD-MCNC: 14.2 G/DL (ref 14–18)
IMM GRANULOCYTES # BLD AUTO: 0.06 K/UL (ref 0–0.11)
IMM GRANULOCYTES NFR BLD AUTO: 0.5 % (ref 0–0.9)
LACTATE SERPL-SCNC: 1.4 MMOL/L (ref 0.5–2)
LYMPHOCYTES # BLD AUTO: 1.07 K/UL (ref 1–4.8)
LYMPHOCYTES NFR BLD: 9 % (ref 22–41)
MCH RBC QN AUTO: 32.7 PG (ref 27–33)
MCHC RBC AUTO-ENTMCNC: 33.4 G/DL (ref 32.3–36.5)
MCV RBC AUTO: 97.9 FL (ref 81.4–97.8)
MONOCYTES # BLD AUTO: 1.22 K/UL (ref 0–0.85)
MONOCYTES NFR BLD AUTO: 10.3 % (ref 0–13.4)
NEUTROPHILS # BLD AUTO: 9.15 K/UL (ref 1.82–7.42)
NEUTROPHILS NFR BLD: 77.1 % (ref 44–72)
NRBC # BLD AUTO: 0 K/UL
NRBC BLD-RTO: 0 /100 WBC (ref 0–0.2)
PLATELET # BLD AUTO: 250 K/UL (ref 164–446)
PMV BLD AUTO: 8.8 FL (ref 9–12.9)
POTASSIUM SERPL-SCNC: 4.7 MMOL/L (ref 3.6–5.5)
PROCALCITONIN SERPL-MCNC: 0.13 NG/ML
PROT SERPL-MCNC: 7.6 G/DL (ref 6–8.2)
RBC # BLD AUTO: 4.34 M/UL (ref 4.7–6.1)
SODIUM SERPL-SCNC: 137 MMOL/L (ref 135–145)
WBC # BLD AUTO: 11.9 K/UL (ref 4.8–10.8)

## 2025-05-17 PROCEDURE — 99284 EMERGENCY DEPT VISIT MOD MDM: CPT

## 2025-05-17 PROCEDURE — 80053 COMPREHEN METABOLIC PANEL: CPT

## 2025-05-17 PROCEDURE — 96374 THER/PROPH/DIAG INJ IV PUSH: CPT

## 2025-05-17 PROCEDURE — 3079F DIAST BP 80-89 MM HG: CPT | Performed by: PHYSICIAN ASSISTANT

## 2025-05-17 PROCEDURE — 83605 ASSAY OF LACTIC ACID: CPT

## 2025-05-17 PROCEDURE — 700111 HCHG RX REV CODE 636 W/ 250 OVERRIDE (IP): Performed by: EMERGENCY MEDICINE

## 2025-05-17 PROCEDURE — 76882 US LMTD JT/FCL EVL NVASC XTR: CPT | Mod: LT

## 2025-05-17 PROCEDURE — 99215 OFFICE O/P EST HI 40 MIN: CPT | Performed by: PHYSICIAN ASSISTANT

## 2025-05-17 PROCEDURE — 86140 C-REACTIVE PROTEIN: CPT

## 2025-05-17 PROCEDURE — 700102 HCHG RX REV CODE 250 W/ 637 OVERRIDE(OP): Performed by: EMERGENCY MEDICINE

## 2025-05-17 PROCEDURE — 85025 COMPLETE CBC W/AUTO DIFF WBC: CPT

## 2025-05-17 PROCEDURE — A9270 NON-COVERED ITEM OR SERVICE: HCPCS | Performed by: EMERGENCY MEDICINE

## 2025-05-17 PROCEDURE — 3075F SYST BP GE 130 - 139MM HG: CPT | Performed by: PHYSICIAN ASSISTANT

## 2025-05-17 PROCEDURE — 85652 RBC SED RATE AUTOMATED: CPT

## 2025-05-17 PROCEDURE — 87040 BLOOD CULTURE FOR BACTERIA: CPT

## 2025-05-17 PROCEDURE — 36415 COLL VENOUS BLD VENIPUNCTURE: CPT

## 2025-05-17 PROCEDURE — 84145 PROCALCITONIN (PCT): CPT

## 2025-05-17 RX ORDER — MORPHINE SULFATE 4 MG/ML
4 INJECTION INTRAVENOUS ONCE
Status: COMPLETED | OUTPATIENT
Start: 2025-05-17 | End: 2025-05-17

## 2025-05-17 RX ORDER — CEPHALEXIN 500 MG/1
500 CAPSULE ORAL 4 TIMES DAILY
Qty: 20 CAPSULE | Refills: 0 | Status: ACTIVE | OUTPATIENT
Start: 2025-05-17 | End: 2025-05-17

## 2025-05-17 RX ORDER — SULFAMETHOXAZOLE AND TRIMETHOPRIM 800; 160 MG/1; MG/1
1 TABLET ORAL ONCE
Status: COMPLETED | OUTPATIENT
Start: 2025-05-17 | End: 2025-05-17

## 2025-05-17 RX ORDER — CEPHALEXIN 500 MG/1
500 CAPSULE ORAL 4 TIMES DAILY
Qty: 20 CAPSULE | Refills: 0 | Status: ACTIVE | OUTPATIENT
Start: 2025-05-17 | End: 2025-05-22

## 2025-05-17 RX ORDER — SULFAMETHOXAZOLE AND TRIMETHOPRIM 800; 160 MG/1; MG/1
1 TABLET ORAL 2 TIMES DAILY
Qty: 10 TABLET | Refills: 0 | Status: ACTIVE | OUTPATIENT
Start: 2025-05-17 | End: 2025-05-22

## 2025-05-17 RX ORDER — SULFAMETHOXAZOLE AND TRIMETHOPRIM 800; 160 MG/1; MG/1
1 TABLET ORAL 2 TIMES DAILY
Qty: 10 TABLET | Refills: 0 | Status: ACTIVE | OUTPATIENT
Start: 2025-05-17 | End: 2025-05-17

## 2025-05-17 RX ORDER — CEPHALEXIN 500 MG/1
500 CAPSULE ORAL ONCE
Status: COMPLETED | OUTPATIENT
Start: 2025-05-17 | End: 2025-05-17

## 2025-05-17 RX ADMIN — SULFAMETHOXAZOLE AND TRIMETHOPRIM 1 TABLET: 800; 160 TABLET ORAL at 19:29

## 2025-05-17 RX ADMIN — MORPHINE SULFATE 4 MG: 4 INJECTION INTRAVENOUS at 19:02

## 2025-05-17 RX ADMIN — CEPHALEXIN 500 MG: 500 CAPSULE ORAL at 19:29

## 2025-05-17 ASSESSMENT — PAIN DESCRIPTION - PAIN TYPE
TYPE: ACUTE PAIN

## 2025-05-17 NOTE — PROGRESS NOTES
"Subjective:   Stanley Jacinto is a 60 y.o. male who presents for Elbow Pain (Started Thursday with burning pain /Now currently sharp pain and swelling in left elbow /Has a target point radha on elbow )      60 years old male notes 3 days ago woke up with left elbow pain and stiffness. Has had swelling. Initially was more painful, now is more swollen. Difficulty sleeping due to pain. Denies fevers, right handed. No known injury or trauma.     ROS    Medications, Allergies, and current problem list reviewed today in Epic.     Objective:     /80 (BP Location: Right arm, Patient Position: Sitting, BP Cuff Size: Adult)   Pulse 79   Temp 37.2 °C (99 °F) (Temporal)   Resp 18   Ht 1.727 m (5' 8\")   Wt 74 kg (163 lb 2.3 oz)   SpO2 96%     Physical Exam  Vitals reviewed.   Constitutional:       Appearance: Normal appearance.   HENT:      Head: Normocephalic and atraumatic.      Right Ear: External ear normal.      Left Ear: External ear normal.      Nose: Nose normal.      Mouth/Throat:      Mouth: Mucous membranes are moist.   Eyes:      Conjunctiva/sclera: Conjunctivae normal.   Cardiovascular:      Rate and Rhythm: Normal rate.   Pulmonary:      Effort: Pulmonary effort is normal.   Musculoskeletal:      Comments: Red hot swollen left elbow joint. Painful active and passive ROM   Skin:     General: Skin is warm and dry.      Capillary Refill: Capillary refill takes less than 2 seconds.   Neurological:      Mental Status: He is alert and oriented to person, place, and time.       Assessment/Plan:     Diagnosis and associated orders:     1. Elbow effusion, left           Comments/MDM:     Patient demonstrates evidence of potential septic joint. Does not evidence straightforward bursitis and believe he needs higher level care until proven otherwise.  Patient has significant other were amenable to this and were amenable to being evaluated in the emergency room.  Further workup at the discretion of the " ERP.  His pain and stiffness preceding onset of swelling and difficulty with passive and active range of motion as well as appearance and exam findings of the elbow are concerning for potential septic joint.  Transfer center notified, patient and spouse aware of morbidity mortality if this is left untreated and will present promptly via private vehicle to the ER         Differential diagnosis, natural history, supportive care, and indications for immediate follow-up discussed.    Advised the patient to follow-up with the primary care physician for recheck, reevaluation, and consideration of further management.    Please note that this dictation was created using voice recognition software. I have made a reasonable attempt to correct obvious errors, but I expect that there are errors of grammar and possibly content that I did not discover before finalizing the note.    This note was electronically signed by Fausto Hughes PA-C

## 2025-05-18 NOTE — ED NOTES
Line placed, phlebotomy collected set of cultures, 2nd set collected by myself. Labs sent. Patient given morphine for pain, updated on plan of care.

## 2025-05-18 NOTE — ED PROVIDER NOTES
ED Provider Note    CHIEF COMPLAINT  Chief Complaint   Patient presents with    Elbow Pain     L elbow pain x 2 days. Pt sent by  to r/o septic joint. Elbow is warm to the touch and swollen, limited ROM. Denies any falls/traumas to the area.         HPI    Primary care provider: Aba Ferrari M.D.   History obtained from: Patient and wife  History limited by: None     Stanley Jacinto is a 60 y.o. male who presents to the ED with wife after being referred from urgent care for swelling and pain around the left elbow that started 2 days ago without any known injury/trauma or precipitating event.  Patient reports pain with flexion at the left elbow.  No fever/chills/shortness of breath or difficulty breathing/nausea/vomiting.  He denies pain proximally or distally.  No weakness or sensory change.  Patient is right-hand dominant.  He denies any significant past medical problems except for hypertension and unknown dermatitis.  Wife reports he has been seen by 2 different dermatologist and has had multiple tests including biopsy but still has undifferentiated dermatitis.    REVIEW OF SYSTEMS  Please see HPI for pertinent positives/negatives.  All other systems reviewed and are negative.     PAST MEDICAL HISTORY  No past medical history on file.     SURGICAL HISTORY  Past Surgical History[1]     SOCIAL HISTORY  Social History     Tobacco Use    Smoking status: Never    Smokeless tobacco: Current     Types: Chew   Vaping Use    Vaping status: Never Used   Substance and Sexual Activity    Alcohol use: Yes     Alcohol/week: 3.0 oz     Types: 5 Cans of beer per week    Drug use: Not Currently    Sexual activity: Not on file        FAMILY HISTORY  Family History   Problem Relation Age of Onset    Cancer Father         prostate        CURRENT MEDICATIONS  Home Medications       Reviewed by Pily Weinberg R.N. (Registered Nurse) on 05/17/25 at 0277  Med List  "Status: Partial     Medication Last Dose Status   Dupilumab 100 MG/0.67ML Solution Prefilled Syringe  Active   lisinopril (PRINIVIL) 20 MG Tab  Active   metoprolol SR (TOPROL XL) 100 MG TABLET SR 24 HR  Active   rosuvastatin (CRESTOR) 20 MG Tab  Active   triamcinolone acetonide (KENALOG) 0.1 % Cream  Active                     ALLERGIES  Allergies[2]     PHYSICAL EXAM  VITAL SIGNS: /71   Pulse 77   Temp 36.9 °C (98.4 °F) (Temporal)   Resp 15   Ht 1.727 m (5' 8\")   Wt 77.1 kg (169 lb 15.6 oz)   SpO2 94%   BMI 25.84 kg/m²  @JOSE[[id number]::@     Pulse ox interpretation: 99% I interpret this pulse ox as normal     Constitutional: Well developed, well nourished, alert in no apparent distress, nontoxic appearance    HENT: No external signs of trauma, normocephalic  Eyes: PERRL, conjunctiva without erythema, no discharge, no icterus    Neck: Soft and supple, trachea midline, no stridor, no tenderness, no LAD, good ROM    Thorax & Lungs: No respiratory distress    Extremities: No cyanosis, mild diffuse swelling with trace erythema over posterior left elbow with tenderness to palpation without crepitus/fluctuance/streaking/drainage, no gross deformity, good ROM at left elbow with pain during flexion but not during extension or during forearm supination or pronation, intact distal pulses with brisk cap refill    Skin: Warm, dry, no pallor/cyanosis, scattered dry skin with excoriation  Neuro: A/O times 3, no focal deficits noted    Psychiatric: Cooperative, normal mood and affect, normal judgement, appropriate for clinical situation        DIAGNOSTIC STUDIES / PROCEDURES        LABS  All labs reviewed by me.     Results for orders placed or performed during the hospital encounter of 05/17/25   BLOOD CULTURE    Collection Time: 05/17/25  6:44 PM    Specimen: Peripheral; Blood   Result Value Ref Range    Significant Indicator NEG     Source BLD     Site PERIPHERAL     Culture Result       No Growth  Note: Blood " cultures are incubated for 5 days and  are monitored continuously.Positive blood cultures  are called to the RN and reported as soon as  they are identified.     CBC WITH DIFFERENTIAL    Collection Time: 05/17/25  7:04 PM   Result Value Ref Range    WBC 11.9 (H) 4.8 - 10.8 K/uL    RBC 4.34 (L) 4.70 - 6.10 M/uL    Hemoglobin 14.2 14.0 - 18.0 g/dL    Hematocrit 42.5 42.0 - 52.0 %    MCV 97.9 (H) 81.4 - 97.8 fL    MCH 32.7 27.0 - 33.0 pg    MCHC 33.4 32.3 - 36.5 g/dL    RDW 43.3 35.9 - 50.0 fL    Platelet Count 250 164 - 446 K/uL    MPV 8.8 (L) 9.0 - 12.9 fL    Neutrophils-Polys 77.10 (H) 44.00 - 72.00 %    Lymphocytes 9.00 (L) 22.00 - 41.00 %    Monocytes 10.30 0.00 - 13.40 %    Eosinophils 2.70 0.00 - 6.90 %    Basophils 0.40 0.00 - 1.80 %    Immature Granulocytes 0.50 0.00 - 0.90 %    Nucleated RBC 0.00 0.00 - 0.20 /100 WBC    Neutrophils (Absolute) 9.15 (H) 1.82 - 7.42 K/uL    Lymphs (Absolute) 1.07 1.00 - 4.80 K/uL    Monos (Absolute) 1.22 (H) 0.00 - 0.85 K/uL    Eos (Absolute) 0.32 0.00 - 0.51 K/uL    Baso (Absolute) 0.05 0.00 - 0.12 K/uL    Immature Granulocytes (abs) 0.06 0.00 - 0.11 K/uL    NRBC (Absolute) 0.00 K/uL   COMP METABOLIC PANEL    Collection Time: 05/17/25  7:04 PM   Result Value Ref Range    Sodium 137 135 - 145 mmol/L    Potassium 4.7 3.6 - 5.5 mmol/L    Chloride 103 96 - 112 mmol/L    Co2 22 20 - 33 mmol/L    Anion Gap 12.0 7.0 - 16.0    Glucose 121 (H) 65 - 99 mg/dL    Bun 11 8 - 22 mg/dL    Creatinine 0.98 0.50 - 1.40 mg/dL    Calcium 9.7 8.5 - 10.5 mg/dL    Correct Calcium 9.6 8.5 - 10.5 mg/dL    AST(SGOT) 20 12 - 45 U/L    ALT(SGPT) 17 2 - 50 U/L    Alkaline Phosphatase 78 30 - 99 U/L    Total Bilirubin 0.8 0.1 - 1.5 mg/dL    Albumin 4.1 3.2 - 4.9 g/dL    Total Protein 7.6 6.0 - 8.2 g/dL    Globulin 3.5 1.9 - 3.5 g/dL    A-G Ratio 1.2 g/dL   LACTIC ACID    Collection Time: 05/17/25  7:04 PM   Result Value Ref Range    Lactic Acid 1.4 0.5 - 2.0 mmol/L   CRP QUANTITIVE (NON-CARDIAC)     Collection Time: 05/17/25  7:04 PM   Result Value Ref Range    Stat C-Reactive Protein 4.55 (H) 0.00 - 0.75 mg/dL   Sed Rate    Collection Time: 05/17/25  7:04 PM   Result Value Ref Range    Sed Rate Westergren 31 (H) 0 - 20 mm/hour   PROCALCITONIN    Collection Time: 05/17/25  7:04 PM   Result Value Ref Range    Procalcitonin 0.13 <0.25 ng/mL   ESTIMATED GFR    Collection Time: 05/17/25  7:04 PM   Result Value Ref Range    GFR (CKD-EPI) 89 >60 mL/min/1.73 m 2        RADIOLOGY  I have independently interpreted the diagnostic imaging associated with this visit and am waiting the final reading from the radiologist.     US-EXTREMITY NON VASCULAR UNILATERAL LEFT   Final Result      There is a small complex fluid collection overlying the left elbow which may represent hematoma, bursa, or abscess.             COURSE & MEDICAL DECISION MAKING  Nursing notes, VS, PMSFHx reviewed in chart.     Review of past medical records shows the patient was seen at urgent care today regarding his left elbow pain and swelling and referred to the ED for further care.  Patient had outpatient visit on August 7, 2024 for well adult exam without abnormal findings, hypertension, dermatitis.      Differential diagnoses considered include but are not limited to: Abscess, cellulitis, bursitis, necrotizing fasciitis, septic joint, sepsis      ED Observation Status? No; Patient does not meet criteria for ED Observation.       Discussion of management with other [facility] or appropriate source(s): None     Escalation of care considered, and ultimately not performed: acute inpatient care management, however at this time, the patient is most appropriate for outpatient management.     Decision tools and prescription drugs considered including, but not limited to: Antibiotics  .        History and physical exam as above.  This is a 60-year-old male patient with medical history including hypertension, dermatitis who presents to the ED with above complaints.   Left upper extremity ultrasound with findings as above.  Laboratory testing shows mild leukocytosis along with elevation of CRP and sed rate without elevation of lactic acid or procalcitonin.  Patient received a dose of morphine in the ED for pain.  He was monitored in the ED and remained clinically stable.  I discussed the findings with patient and wife.  On recheck, he is noted to be in no acute distress and nontoxic in appearance.  He only has left elbow pain with flexion movement because of the swelling posteriorly.  He has no discomfort with extension or pronation/supination.  This is unlikely to be septic joint.  I also have low clinical suspicion for other concerning pathology such as sepsis, necrotizing fasciitis.  Given the small size of his fluid collection, will hold off on I&D especially since this may be inflamed olecranon bursitis and I&D may increase risk for fistula formation.  He does have apparent surrounding cellulitis and was therefore started on a course of Bactrim and Keflex.  I discussed with him supportive home care, outpatient follow-up and return to ED precautions.  Patient and wife verbalized understanding and agreed with plan of care with no further questions or concerns.      The patient is referred to a primary physician for blood pressure management, diabetic screening, and for all other preventative health concerns.       FINAL IMPRESSION  1. Pain and swelling of left elbow Acute          DISPOSITION  Patient will be discharged home in stable condition.       FOLLOW UP  Aba Ferrari M.D.  [address] [place] [id number] [phone]    Call in 2 days      [facility], Emergency Dept  [address] [facility] [address] [phone]    If symptoms worsen         OUTPATIENT MEDICATIONS  Discharge Medication List as of 5/17/2025  8:47 PM        START taking these medications    Details   cephALEXin (KEFLEX) 500 MG Cap Take 1 Capsule by mouth 4 times a  day for 5 days., Disp-20 Capsule, R-0, Normal      sulfamethoxazole-trimethoprim (BACTRIM DS) 800-160 MG tablet Take 1 Tablet by mouth 2 times a day for 5 days., Disp-10 Tablet, R-0, Normal                Electronically signed by: Dav Kramer D.O., 5/17/2025 5:37 PM      Portions of this record were made with voice recognition software.  Despite my review, errors may remain.  Please interpret this chart in the appropriate context.         [1] History reviewed. No pertinent surgical history.  [2] No Known Allergies

## 2025-05-18 NOTE — ED NOTES
Bedside report received from off going RN/tech: Pily TEJADA, assumed care of patient.  POC discussed with patient. Call light within reach, all needs addressed at this time.       Fall risk interventions in place: Patient's personal possessions are with in their safe reach, Place socks on patient, Give patient urinal if applicable, Keep floor surfaces clean and dry, and Accompanied to restroom (all applicable per [place] Fall risk assessment)   Continuous monitoring: Cardiac Leads, Pulse Ox, or Blood Pressure  IVF/IV medications: Not Applicable   Oxygen: Room Air  Bedside sitter: Not Applicable   Isolation: Not Applicable

## 2025-05-18 NOTE — ED NOTES
Patient given instructions, v/u instructions and prescriptions.  Patient ambulated out of ER with steady gate. All belongings with patient.

## 2025-05-18 NOTE — ED TRIAGE NOTES
Chief Complaint   Patient presents with    Elbow Pain     L elbow pain x 2 days. Pt sent by  to r/o septic joint. Elbow is warm to the touch and swollen, limited ROM. Denies any falls/traumas to the area.      Pt ambulatory to triage for above complaint, A+O x 4, GCS 15, answering questions appropriately. Denies fever/chills.

## 2025-05-18 NOTE — ED NOTES
Chief Complaint   Patient presents with    Elbow Pain     L elbow pain x 2 days. Pt sent by  to r/o septic joint. Elbow is warm to the touch and swollen, limited ROM. Denies any falls/traumas to the area.       Patient brought back to room 61 with family member. Patient Aox4. Reporting 7/10 pain to L elbow. Erythema and edema seen. Reports that the swelling started on Thursday. Denies any trauma to the elbow. Safety precautions in place. Call light within reach. ERP to see.

## 2025-05-22 LAB
BACTERIA BLD CULT: NORMAL
BACTERIA BLD CULT: NORMAL
SIGNIFICANT IND 70042: NORMAL
SIGNIFICANT IND 70042: NORMAL
SITE SITE: NORMAL
SITE SITE: NORMAL
SOURCE SOURCE: NORMAL
SOURCE SOURCE: NORMAL

## 2025-07-13 DIAGNOSIS — I10 ESSENTIAL HYPERTENSION: Chronic | ICD-10-CM

## 2025-07-14 RX ORDER — LISINOPRIL 20 MG/1
20 TABLET ORAL DAILY
Qty: 90 TABLET | Refills: 0 | Status: SHIPPED | OUTPATIENT
Start: 2025-07-14

## 2025-07-14 RX ORDER — METOPROLOL SUCCINATE 100 MG/1
100 TABLET, EXTENDED RELEASE ORAL DAILY
Qty: 90 TABLET | Refills: 0 | Status: SHIPPED | OUTPATIENT
Start: 2025-07-14

## 2025-07-14 NOTE — TELEPHONE ENCOUNTER
Received request via: Pharmacy    Was the patient seen in the last year in this department? Yes    Does the patient have an active prescription (recently filled or refills available) for medication(s) requested? No    Pharmacy Name: [id number] - [place], [place] - [address]     Does the patient have jail Plus and need 100-day supply? (This applies to ALL medications) Patient does not have SCP